# Patient Record
Sex: FEMALE | Race: BLACK OR AFRICAN AMERICAN | NOT HISPANIC OR LATINO | Employment: FULL TIME | URBAN - METROPOLITAN AREA
[De-identification: names, ages, dates, MRNs, and addresses within clinical notes are randomized per-mention and may not be internally consistent; named-entity substitution may affect disease eponyms.]

---

## 2017-01-10 ENCOUNTER — TELEPHONE (OUTPATIENT)
Dept: INTERNAL MEDICINE | Facility: CLINIC | Age: 57
End: 2017-01-10

## 2017-01-10 ENCOUNTER — OFFICE VISIT (OUTPATIENT)
Dept: INTERNAL MEDICINE | Facility: CLINIC | Age: 57
End: 2017-01-10
Payer: COMMERCIAL

## 2017-01-10 VITALS
DIASTOLIC BLOOD PRESSURE: 76 MMHG | WEIGHT: 280.88 LBS | HEART RATE: 85 BPM | BODY MASS INDEX: 46.8 KG/M2 | OXYGEN SATURATION: 99 % | HEIGHT: 65 IN | SYSTOLIC BLOOD PRESSURE: 105 MMHG | TEMPERATURE: 98 F

## 2017-01-10 DIAGNOSIS — H65.192 OTHER ACUTE NONSUPPURATIVE OTITIS MEDIA OF LEFT EAR, RECURRENCE NOT SPECIFIED: Primary | ICD-10-CM

## 2017-01-10 DIAGNOSIS — E66.01 MORBID OBESITY WITH BMI OF 45.0-49.9, ADULT: ICD-10-CM

## 2017-01-10 PROCEDURE — 99999 PR PBB SHADOW E&M-EST. PATIENT-LVL III: CPT | Mod: PBBFAC,,, | Performed by: INTERNAL MEDICINE

## 2017-01-10 PROCEDURE — 99214 OFFICE O/P EST MOD 30 MIN: CPT | Mod: S$GLB,,, | Performed by: INTERNAL MEDICINE

## 2017-01-10 PROCEDURE — 1159F MED LIST DOCD IN RCRD: CPT | Mod: S$GLB,,, | Performed by: INTERNAL MEDICINE

## 2017-01-10 RX ORDER — AMOXICILLIN AND CLAVULANATE POTASSIUM 875; 125 MG/1; MG/1
1 TABLET, FILM COATED ORAL EVERY 12 HOURS
Qty: 10 TABLET | Refills: 0 | Status: SHIPPED | OUTPATIENT
Start: 2017-01-10 | End: 2017-04-26

## 2017-01-10 NOTE — MR AVS SNAPSHOT
[facility] - Internal Medicine  [address]  [facility] [address]  Phone: [phone]  Fax: [phone]                  Trini Harris   1/10/2017 3:00 PM   Office Visit    Description:  Female : 1960   Provider:  Oscar Thomason MD   Department:  [facility] - Internal Medicine           Reason for Visit     Otalgia           Diagnoses this Visit        Comments    Other acute nonsuppurative otitis media of left ear, recurrence not specified    -  Primary     Morbid obesity with BMI of 45.0-49.9, adult                To Do List           Goals (5 Years of Data)     None      Follow-Up and Disposition     Return if symptoms worsen or fail to improve.       These Medications        Disp Refills Start End    amoxicillin-clavulanate 875-125mg (AUGMENTIN) 875-125 mg per tablet 10 tablet 0 1/10/2017     Take 1 tablet by mouth every 12 (twelve) hours. - Oral    Pharmacy: [facility] - [address] #: [phone]         [facility]     [facility] On [facility] Nurse Care Line -  Assistance  Registered nurses in the [facility] Center provide clinical advisement, health education, appointment booking, and other advisory services.  Call for this free service at [phone].             Medications           Message regarding Medications     Verify the changes and/or additions to your medication regime listed below are the same as discussed with your clinician today.  If any of these changes or additions are incorrect, please notify your healthcare provider.        START taking these NEW medications        Refills    amoxicillin-clavulanate 875-125mg (AUGMENTIN) 875-125 mg per tablet 0    Sig: Take 1 tablet by mouth every 12 (twelve) hours.    Class: Normal    Route: Oral           Verify that the below list of medications is an accurate representation of the medications you are currently taking.  If none reported, the list may be blank. If  "incorrect, please contact your healthcare provider. Carry this list with you in case of emergency.           Current Medications     amoxicillin-clavulanate 875-125mg (AUGMENTIN) 875-125 mg per tablet Take 1 tablet by mouth every 12 (twelve) hours.           Clinical Reference Information           Vital Signs - Last Recorded  Most recent update: 1/10/2017  3:10 PM by Ivana Ovalle MA    BP Pulse Temp Ht Wt LMP    105/76 (BP Location: Left arm, Patient Position: Sitting) 85 97.7 °F (36.5 °C) (Oral) 5' 5" (1.651 m) 127.4 kg (280 lb 13.9 oz) 06/12/2015    SpO2 BMI             99% 46.74 kg/m2         Blood Pressure          Most Recent Value    BP  105/76      Allergies as of 1/10/2017     No Known Allergies      Immunizations Administered on Date of Encounter - 1/10/2017     None      Instructions    Flonase (fluticasone) nasal spray - twice a day for 1 week, then once a day thereafter  Claritin (loratadine) or Zyrtec (cetirizine) once a day  Mucinex (guaifenesin) every 8-12 hours with plenty of water.       Middle Ear Infection (Adult)  You have an infection of the middle ear, the space behind the eardrum. This is also called acute otitis media (AOM). Sometimes it is caused by the common cold. This is because congestion can block the internal passage (eustachian tube) that drains fluid from the middle ear. When the middle ear fills with fluid, bacteria can grow there and cause an infection. Oral antibiotics are used to treat this illness, not ear drops. Symptoms usually start to improve within 1 to 2 days of treatment.    Home care  The following are general care guidelines:  · Finish all of the antibiotic medicine given, even though you may feel better after the first few days.  · You may use over-the-counter medicine, such as acetaminophen or ibuprofen, to control pain and fever, unless something else was prescribed. If you have chronic liver or kidney disease or have ever had a stomach ulcer or " gastrointestinal bleeding, talk with your healthcare provider before using these medicines. Do not give aspirin to anyone under 18 years of age who has a fever. It may cause severe illness or death.  Follow-up care  Follow up with your healthcare provider, or as advised, in 2 weeks if all symptoms have not gotten better, or if hearing doesn't go back to normal within 1 month.  When to seek medical advice  Call your healthcare provider right away if any of these occur:  · Ear pain gets worse or does not improve after 3 days of treatment  · Unusual drowsiness or confusion  · Neck pain, stiff neck, or headache  · Fluid or blood draining from the ear canal  · Fever of 100.4°F (38°C) or as advised   · Seizure  © 6856-9028 [employer]. [address] [place] [address], [place] [address]. All rights reserved. This information is not intended as a substitute for professional medical care. Always follow your healthcare professional's instructions.

## 2017-01-10 NOTE — Clinical Note
Haritha - I saw Ms Harris today for an urgent visit. She is a patient of Dr Moon but interested in losing weight and working with you to do this. Thanks! Oscar Thomason MD

## 2017-01-10 NOTE — TELEPHONE ENCOUNTER
..Patient referred by Dr. Thomason for Health coaching (weight loss, lifestyle changes).  Called patient and gave an explanation about Health Coaching program and invited participation.   Patient states she would like to participate.  Set appointment for 1/25/17 @ 1500   Gave direct contact number to call if he/ she has any questions [phone].   Haritha Egan RN  Health

## 2017-01-10 NOTE — PROGRESS NOTES
"Subjective:       Patient ID: Trini Harris is a 56 y.o. female.    Chief Complaint: Otalgia (Left)    HPI  55 y/o woman here for urgent visit for ear pain.    Ear pain - recent URI (congestion, sinus pressure) last week, had been feeling better in the past 3 days except the ear pain on left side is not improving. Throat sore on left side. No fevers. No drainage from ear.   Took excedrin (some kind of cold medication OTC) yesterday "to make sure I was draining".      Review of Systems   HENT: Positive for congestion, ear pain, postnasal drip, sinus pressure and sore throat. Negative for facial swelling, hearing loss, trouble swallowing and voice change.    Respiratory: Negative for shortness of breath. Cough: improving.    Cardiovascular: Negative for chest pain and leg swelling.   Gastrointestinal: Negative for abdominal pain and nausea.   Genitourinary: Negative.    Musculoskeletal: Negative for joint swelling.   Skin: Negative for rash.   Neurological: Negative for dizziness and numbness.         Past medical history, surgical history, and family medical history reviewed and updated as appropriate.    Medications and allergies reviewed.     Objective:          Vitals:    01/10/17 1507   BP: 105/76   BP Location: Left arm   Patient Position: Sitting   Pulse: 85   Temp: 97.7 °F (36.5 °C)   TempSrc: Oral   SpO2: 99%   Weight: 127.4 kg (280 lb 13.9 oz)   Height: 5' 5" (1.651 m)     Body mass index is 46.74 kg/(m^2).  Physical Exam   Constitutional: She is oriented to person, place, and time. She appears well-developed and well-nourished. No distress.   HENT:   Head: Normocephalic and atraumatic.   Right Ear: External ear and ear canal normal. Tympanic membrane is injected.   Left Ear: External ear and ear canal normal. Tympanic membrane is injected and bulging. A middle ear effusion is present.   Nose: Mucosal edema present. Right sinus exhibits no maxillary sinus tenderness and no frontal sinus tenderness. Left sinus " exhibits no maxillary sinus tenderness and no frontal sinus tenderness.   Mouth/Throat: Posterior oropharyngeal erythema present. No oropharyngeal exudate. No tonsillar exudate.   Eyes: Conjunctivae and EOM are normal. Pupils are equal, round, and reactive to light.   Neck: Neck supple.   Cardiovascular: Normal rate, regular rhythm and normal heart sounds.    Pulmonary/Chest: Effort normal and breath sounds normal. No respiratory distress. She has no wheezes.   Abdominal: Soft. She exhibits no distension. There is no tenderness.   Musculoskeletal: She exhibits no edema or tenderness.   Lymphadenopathy:     She has cervical adenopathy (mild tender LAD, L >R).   Neurological: She is alert and oriented to person, place, and time. Gait normal.   Skin: Skin is warm and dry.   Psychiatric: She has a normal mood and affect.   Vitals reviewed.      Lab Results   Component Value Date    WBC 5.20 10/01/2016    HGB 11.5 (L) 10/01/2016    HCT 36.4 (L) 10/01/2016     10/01/2016    CHOL 149 10/01/2016    TRIG 69 10/01/2016    HDL 40 10/01/2016    ALT 12 10/01/2016    AST 14 10/01/2016     10/01/2016    K 4.4 10/01/2016     10/01/2016    CREATININE 0.8 10/01/2016    BUN 11 10/01/2016    CO2 24 10/01/2016    TSH 2.921 10/01/2016    HGBA1C 5.7 10/01/2016       Assessment:       1. Other acute nonsuppurative otitis media of left ear, recurrence not specified    2. Morbid obesity with BMI of 45.0-49.9, adult        Plan:   Trini was seen today for otalgia.    Diagnoses and all orders for this visit:    Other acute nonsuppurative otitis media of left ear, recurrence not specified - Patient education handout given. Recommended use saline nasal spray, steroid nasal spray, 2nd gen antihistamine to help manage sinus congestion  -     amoxicillin-clavulanate 875-125mg (AUGMENTIN) 875-125 mg per tablet; Take 1 tablet by mouth every 12 (twelve) hours.    Morbid obesity with BMI of 45.0-49.9, adult - discussed briefly with  patient, recommended she discuss this in more detail at next PCP visit. Referred to health .     Return if symptoms worsen or fail to improve.    Oscar Thomason MD  Internal Medicine  [facility]  1/10/2017

## 2017-01-10 NOTE — TELEPHONE ENCOUNTER
----- Message from Oscar Thomason MD sent at 1/10/2017  3:32 PM CST -----  Haritha - I saw Ms Harris today for an urgent visit. She is a patient of Dr Moon but interested in losing weight and working with you to do this.  Thanks!  Oscar Thomason MD

## 2017-01-10 NOTE — PATIENT INSTRUCTIONS
Flonase (fluticasone) nasal spray - twice a day for 1 week, then once a day thereafter  Claritin (loratadine) or Zyrtec (cetirizine) once a day  Mucinex (guaifenesin) every 8-12 hours with plenty of water.       Middle Ear Infection (Adult)  You have an infection of the middle ear, the space behind the eardrum. This is also called acute otitis media (AOM). Sometimes it is caused by the common cold. This is because congestion can block the internal passage (eustachian tube) that drains fluid from the middle ear. When the middle ear fills with fluid, bacteria can grow there and cause an infection. Oral antibiotics are used to treat this illness, not ear drops. Symptoms usually start to improve within 1 to 2 days of treatment.    Home care  The following are general care guidelines:  · Finish all of the antibiotic medicine given, even though you may feel better after the first few days.  · You may use over-the-counter medicine, such as acetaminophen or ibuprofen, to control pain and fever, unless something else was prescribed. If you have chronic liver or kidney disease or have ever had a stomach ulcer or gastrointestinal bleeding, talk with your healthcare provider before using these medicines. Do not give aspirin to anyone under 18 years of age who has a fever. It may cause severe illness or death.  Follow-up care  Follow up with your healthcare provider, or as advised, in 2 weeks if all symptoms have not gotten better, or if hearing doesn't go back to normal within 1 month.  When to seek medical advice  Call your healthcare provider right away if any of these occur:  · Ear pain gets worse or does not improve after 3 days of treatment  · Unusual drowsiness or confusion  · Neck pain, stiff neck, or headache  · Fluid or blood draining from the ear canal  · Fever of 100.4°F (38°C) or as advised   · Seizure  © 1381-3686 [employer]. [address] [place] [address], [place] [address]. All rights reserved. This  information is not intended as a substitute for professional medical care. Always follow your healthcare professional's instructions.

## 2017-01-25 ENCOUNTER — CLINICAL SUPPORT (OUTPATIENT)
Dept: INTERNAL MEDICINE | Facility: CLINIC | Age: 57
End: 2017-01-25
Payer: COMMERCIAL

## 2017-01-25 VITALS — WEIGHT: 285.5 LBS | BODY MASS INDEX: 47.51 KG/M2

## 2017-01-25 NOTE — PROGRESS NOTES
Date:   1/25/17                   Time: 1530  Initial Health  Contact - [x]Clinic visit  []  Phone Visit  ASSEMENT: Information obtained through combination of chart review prior to seeing patient, patient self-report.   Primary Referral diagnosis/reason for referral:    Weight loss     Other:    (See physician progress note for complete list of diagnoses.)  PCP:  Dr. Moon      Referent :  [] PCP       [] Transition Navigator    []  BOBBY    []  JOSEPHINE  [x]  Other: Dr. Thomason    Supports:          Preferred Learning Style  (may be a combination)  []  Visual      [] Auditory     []  Reading    [x]  Hands-on    []  1:1    [x]  Group        [x]  Alone       [x]  No preference   []  Combination  []  Other:         Presenting issues from patients point of view:  [x]  Improve nutrition/increase healthy choices.                    [x]  Improve /maintain current functioning.  []  Obtain and maintain healthy blood pressure.                  []  Stop smoking.  [x]  Improved weight management.                                       [x]  Lower cholesterol.Raise HDL  []  Improved blood glucose management.                            []  Improve breathing.  []  Increase energy level.                                                      []  Increase/maintain independence.  []  Improve sleep.                                                                []  Decrease stress.  []  Improve pain management.                                             []  Improve mood.  []  Other:                  Pt. Education Level:  Business school          Disease specific education services  attended:    None       Patient Employed:  [x]yes    [] No  Days and Hours:  [employer] 630-300 M-W, 7-330 Th-F             Current Self-help Behaviors  []  Checks glucose level        times a day.  [x]  Tries to modify meals so more healthy.  [x]  Exercises by  walking      []  Takes BP        times a       (insert frequency)  []  Weighs  self         (how often)  []  Takes all medications as prescribed.  [x]  Rarely misses health care appointments.  []  Sleeps 8 hours without waking more than twice.  [x]  Consistently wears/uses functioning aids as recommended  (ie glasses, hearing  aid, prosthesis,  walker,  wheelchair etc.)  [x]  Regularly engages in stress management activities   I.e.: prayer  []  Quit smoking   [x]  Purposefully educates self about health issues.  []  Pt did bring glucose log with him/her.  []  Other  (explain)           []  Comments:       []  Reported Blood Sugar Readings:          Health screenings   Last PCP visit:   9/21/16   GYN/Pap:  Kevin Shipman 11/2016                         MMG:      Kevin Shipman 12/21/16                                         CMP:   10/1/16   HgA1C: 10/1/16 - 5.7             Eye Exam:   Dr. [facility] -Normal   Foot Exam: podiatrist ankle check 6/22/15     Lipids: 10/1/16    Strengths:  [x]  Confident     [x]  Creative    [x]  Determined/persistent    [x]  Enthusiastic         [x]  Flexible  [x]  Good problem solving         [x]  Good self-control            [x]  Hard working        []  Hopeful/optimistic               [x]  Intelligent                         [x]  Pos support network  [x]  Self-aware        [x]  Sense of humor                                [x]  Open/willing       [x]  Spiritual            [] Values health    [x]  Successful overcoming difficult challenges in the past.                  [x]   Health literate (The degree to which individuals have the capacity to obtain, process, and    understand basic health information and services needed to make appropriate health decisions.- Dept. of Health and Human services.)  []   Other Comments:             Change Markers  Scale 1-10 with 10 representing most important, most confident.     [] NA at this time.   Readiness: 10    ;  Importance: 10    ;  Confidence:  10       INTERVENTIONS:  [x] Given an explanation about health coaching program and invited participation.  [x] Listened reflectively; provided support, encouragement, and validation; respected  and maintained patient self-direction; explored pros/cons of change; personalized health risks of maintaining the status quo; and facilitated recognition of discrepancy between current behaviors and patients goals and values.  [x] Assessed stage of change and employed appropriate motivational interviewing strategies to facilitate movement toward the next stage of change and healthy behavior modification.  [x] Normalized occurrence of relapse, helped patient recognize outcome of new self-learning as a result of the relapse such as triggers, and helped focus on factors to reduce chances of returning to previous behaviors .  [x] Used readiness scale ratings to guide assessment of importance and confidence of successfully reaching goals.  [x] Assisted patient to develop goal, action plan, and address potential barriers to goal     achievement.  [] Patient was given a new (insert glucose meter or other supplies), taught to use, and      successfully demonstrated ability to carry out essential steps of process.    [] Rx for ( monitor/supplies, pen needles, etc.) was obtained.  [x] Provided educational review, written materials/handouts (Meal Planning Counting Carbs, Healthy Plate, 10 Rules for Eating Safely for Life, 10 Tips to Cutting Your Cravings, probiotics).   [x] Topics discussed/provided:    GI of foods and how it affects your metabolism and helps you burn fat, carb counting    [x] Facilitated completion of needed exams and screenings by reviewing Diabetic/Health Maintenance Report Card with patient.  [x] Provided pt with my business card.  [x] Informed of/reviewed how to access health  and after hours assistance.  [x] Discussed, planned, and scheduled future contacts.  [x]Challenges/Barriers identified discussed as  identified by patient.  [x] Needs identified by this Health :    Education and support     [] Other:            For additional care management support patient was referred to:        []  Community resources for                        []  Medication assistance programs               []  Dietician              []  Diabetes  [employer] [place]                                        []  Mental health services                           []                  []  Diabetes [place]                                              []  Home health services                                []  Complex case management              []  PCP/covering provider due to                 []  Emergency Dept.                                  []  GYN              []  Optometrist/ Ophthalmologist                          []  Podiatrist                                                      [x]  N/A        []  Other:             RESPONSE/IMPRESSION  Behavior is consistent with the following stage of change:  []  Pre-contemplation  []  Contemplation  [x]  Preparation  []  Action  []  Maintenance  []  Relapse    Level of participation:  []  Refused to participate  []  Guarded / resistant  []  Passive participant  [x]  Active participant/interactive  [x]  Interested/receptive  [x]  Self-motivated  []  Other          Goal developed with patient today:     I need to walk more 4-5 x week 30-40 min 10/10  Eat healthy food- veg, fruit etc 10/10  Drink lots of water 8 bottles day 10/10    Plan (needed actions to accomplish goal):          [x] Pt will work on action plan as stated above.        [x] Pt will follow up with Health  on 2/7/17 at 1530.         [x] Health  will remain available.  [x] Health  will maintain regular contacts with patient to educate, support, encourage; help problem-solve; assist with development of self-advocacy/increasing independent health management; and provide resources as needed.  [] Pt has declined Health   Program at this time. Provided pt with Health  Program information should they decide to participate at a later time.        [] Pt to facilitate contact with Health        [] Health  to facilitate contact with patient.        [] Other:          Notes:  Met with patient she is motivated to lose weight her best reasons are to live longer, be more active and have a better sex life.   Goals set with patient and will assist to achieve her goals.     Best Method of Contact:  [] email:   [x] Phone:   [] Mail  [] Office     Haritha Egan RN HC

## 2017-02-07 ENCOUNTER — TELEPHONE (OUTPATIENT)
Dept: INTERNAL MEDICINE | Facility: CLINIC | Age: 57
End: 2017-02-07
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
## 2017-02-22 ENCOUNTER — CLINICAL SUPPORT (OUTPATIENT)
Dept: INTERNAL MEDICINE | Facility: CLINIC | Age: 57
End: 2017-02-22
Payer: COMMERCIAL

## 2017-02-22 VITALS — WEIGHT: 282.44 LBS | BODY MASS INDEX: 47 KG/M2

## 2017-02-22 NOTE — PROGRESS NOTES
Health  Follow-Up Note   Date:   2/22/16               Time: 1600  [x] Office  [] Phone  Notes from previous session reviewed.   [] Previous Session Goals unchanged.   [] Patient/Caregiver Change in Goals.  Goals added or changed by Patient/Caregiver since program participation:  1.    Start walking  2. Stay on track  3. Keep under 25 gms sugar day     Additional/Changed support that patient/caregiver has experienced/sought?  (Indicate readiness, support from family, friends, others, community groups, etc)  1.  daughter and     Feedback provided:  1.  praised for great job down 3 lbs    Diagnostic values/Desriptors for follow-up as needed for chronic condition(s)   Weight: 128.1 kg  282 lb 6.6 oz    Interventions:   1. Health  listened reflectively, validated thoughts and feelings, offered support and encouragement.   2. Allowed patient to express themselves in a non-biased atmosphere.  3. Health  assisted pt to problem-solve obstacles such as being in a challenging environment and dealing with these challenges.   4. Motivational Interviewed interventions utilized (OARS).   5. Patient responded favorably to interventions and remained actively engaged in the session.   6. Health  will remain available and connected for patient by phone and/or office visits.   7. Positive reinforcement, emotional support and encouragement provided.   8. Focused Education: MI, exercise    Plan:  [x] Pt will work on goals as stated above.   [x] Pt will contact Health  for any questions, concerns or needs.  [x] Pt will follow up with Health  in office 3/13/17 at 400 pm        [] Pt will follow up with Health  on phone in:        [x] Health  will remain available.   [] Health  will contact patient by phone in:        [] Health  will consult:        [] Health  will inform Provider via EPIC messaging.     Impression:  1. Behavior is consistent with  Action     Stage of Change.    2. Participation level:       [x] Receptive      [x] Interactive      [] Guarded and Resistant      [x] Self Motivated      [] Refused/Declined to participate   3. [x] Pt voiced understanding of all information presented.       [] Pt voiced needing further information/education. This will be arranged.       [x] Pt would benefit from further education/information as identified by this health . This will be arranged.     Haritha Egan RN HC

## 2017-03-13 ENCOUNTER — CLINICAL SUPPORT (OUTPATIENT)
Dept: INTERNAL MEDICINE | Facility: CLINIC | Age: 57
End: 2017-03-13
Payer: COMMERCIAL

## 2017-03-13 VITALS — BODY MASS INDEX: 46.78 KG/M2 | WEIGHT: 281.06 LBS

## 2017-03-13 NOTE — PROGRESS NOTES
Health  Follow-Up Note   [x] Office  [] Phone  Notes from previous session reviewed.   [x] Previous Session Goals unchanged.   [] Patient/Caregiver Change in Goals.  Goals added or changed by Patient/Caregiver since program participation:  1.     Start walking 2x day      Additional/Changed support that patient/caregiver has experienced/sought?  (Indicate readiness, support from family, friends, others, community groups, etc)  1.  sister-in -law, daughter    Additional/Changed obstacles that could prevent patient/caregiver from reaching their goals?  1.  Donuts, sugar craving    Feedback provided:  1.  Praised great effort and job lost 1.5 lb    Diagnostic values/Desriptors for follow-up as needed for chronic condition(s)   Weight: 127.5 kg 281 lb 1.4 oz    Interventions:   1. Health  listened reflectively, validated thoughts and feelings, offered support and encouragement.   2. Allowed patient to express themselves in a non-biased atmosphere.  3. Health  assisted pt to problem-solve obstacles such as being in a challenging environment and dealing with these challenges.   4. Motivational Interviewed interventions utilized (OARS).   5. Patient responded favorably to interventions and remained actively engaged in the session.   6. Health  will remain available and connected for patient by phone and/or office visits.   7. Positive reinforcement, emotional support and encouragement provided.   8. Focused Education: MI, sugar    Plan:  [x] Pt will work on goals as stated above.   [x] Pt will contact Health  for any questions, concerns or needs.  [x] Pt will follow up with Health  in office on 3/28/17 at 1600.       [] Pt will follow up with Health  on phone in:        [x] Health  will remain available.   [] Health  will contact patient by phone in:        [] Health  will consult:        [] Health  will inform Provider via EPIC messaging.     Impression:  1. Behavior  is consistent with   Action Stage of Change.   2. Participation level:       [x] Receptive      [x] Interactive      [] Guarded and Resistant      [x] Self Motivated      [] Refused/Declined to participate   3. [x] Pt voiced understanding of all information presented.       [] Pt voiced needing further information/education. This will be arranged.       [x] Pt would benefit from further education/information as identified by this health . This will be arranged.     Haritha Egan RN HC

## 2017-03-28 ENCOUNTER — CLINICAL SUPPORT (OUTPATIENT)
Dept: INTERNAL MEDICINE | Facility: CLINIC | Age: 57
End: 2017-03-28
Payer: COMMERCIAL

## 2017-03-28 VITALS — WEIGHT: 281.63 LBS | BODY MASS INDEX: 46.86 KG/M2

## 2017-03-28 NOTE — PROGRESS NOTES
Health  Follow-Up Note   [x] Office  [] Phone  Notes from previous session reviewed.   [x] Previous Session Goals unchanged.   [] Patient/Caregiver Change in Goals.  Goals added or changed by Patient/Caregiver since program participation:  1. Continue same goal  2. Portion control     Additional/Changed support that patient/caregiver has experienced/sought?  (Indicate readiness, support from family, friends, others, community groups, etc)  1.      Additional/Changed obstacles that could prevent patient/caregiver from reaching their goals?  1.  Not exercising enough    Feedback provided:  1.  Praised for continued effort    Diagnostic values/Desriptors for follow-up as needed for chronic condition(s)   Weight: 127.7 kg 281.61 lb gain .5 lb    Interventions:   1. Health  listened reflectively, validated thoughts and feelings, offered support and encouragement.   2. Allowed patient to express themselves in a non-biased atmosphere.  3. Health  assisted pt to problem-solve obstacles such as being in a challenging environment and dealing with these challenges.   4. Motivational Interviewed interventions utilized (OARS).   5. Patient responded favorably to interventions and remained actively engaged in the session.   6. Health  will remain available and connected for patient by phone and/or office visits.   7. Positive reinforcement, emotional support and encouragement provided.   8. Focused Education: MI, food pairing,    Plan:  [x] Pt will work on goals as stated above.   [x] Pt will contact Health  for any questions, concerns or needs.  [x] Pt will follow up with Health  in office 4/10/17 at 1600.        [] Pt will follow up with Health  on phone in:        [x] Health  will remain available.   [] Health  will contact patient by phone in:        [] Health  will consult:        [] Health  will inform Provider via EPIC messaging.     Impression:  1. Behavior is  consistent with Action Stage of Change.   2. Participation level:       [x] Receptive      [x] Interactive      [] Guarded and Resistant      [x] Self Motivated      [] Refused/Declined to participate   3. [x] Pt voiced understanding of all information presented.       [] Pt voiced needing further information/education. This will be arranged.       [x] Pt would benefit from further education/information as identified by this health . This will be arranged.     Haritha Egan RN HC

## 2017-04-26 ENCOUNTER — OFFICE VISIT (OUTPATIENT)
Dept: INTERNAL MEDICINE | Facility: CLINIC | Age: 57
End: 2017-04-26
Payer: COMMERCIAL

## 2017-04-26 VITALS
WEIGHT: 274.69 LBS | BODY MASS INDEX: 45.77 KG/M2 | SYSTOLIC BLOOD PRESSURE: 122 MMHG | HEIGHT: 65 IN | TEMPERATURE: 98 F | DIASTOLIC BLOOD PRESSURE: 79 MMHG | OXYGEN SATURATION: 98 % | HEART RATE: 83 BPM

## 2017-04-26 DIAGNOSIS — J40 BRONCHITIS: Primary | ICD-10-CM

## 2017-04-26 PROCEDURE — 99999 PR PBB SHADOW E&M-EST. PATIENT-LVL III: CPT | Mod: PBBFAC,,, | Performed by: NURSE PRACTITIONER

## 2017-04-26 PROCEDURE — 99213 OFFICE O/P EST LOW 20 MIN: CPT | Mod: S$GLB,,, | Performed by: NURSE PRACTITIONER

## 2017-04-26 PROCEDURE — 1160F RVW MEDS BY RX/DR IN RCRD: CPT | Mod: S$GLB,,, | Performed by: NURSE PRACTITIONER

## 2017-04-26 RX ORDER — PROMETHAZINE HYDROCHLORIDE AND CODEINE PHOSPHATE 6.25; 1 MG/5ML; MG/5ML
5 SOLUTION ORAL EVERY 4 HOURS PRN
Qty: 120 ML | Refills: 0 | Status: SHIPPED | OUTPATIENT
Start: 2017-04-26 | End: 2017-05-06

## 2017-04-26 RX ORDER — BENZONATATE 200 MG/1
200 CAPSULE ORAL 2 TIMES DAILY PRN
Qty: 30 CAPSULE | Refills: 0 | Status: SHIPPED | OUTPATIENT
Start: 2017-04-26 | End: 2017-12-19

## 2017-04-26 RX ORDER — PREDNISONE 20 MG/1
20 TABLET ORAL DAILY
Qty: 3 TABLET | Refills: 0 | Status: SHIPPED | OUTPATIENT
Start: 2017-04-26 | End: 2017-04-29

## 2017-04-26 NOTE — MR AVS SNAPSHOT
[facility]  [address]  [place] [address]  Phone: [phone]  Fax: [phone]                  Trini Harris   2017 3:00 PM   Office Visit    Description:  Female : 1960   Provider:  Macey Louise NP   Department:  [facility]           Reason for Visit     Cough           Diagnoses this Visit        Comments    Bronchitis    -  Primary            To Do List           Goals (5 Years of Data)     None       These Medications        Disp Refills Start End    predniSONE (DELTASONE) 20 MG tablet 3 tablet 0 2017    Take 1 tablet (20 mg total) by mouth once daily. - Oral    Pharmacy: [facility] - [address] Ph #: [phone]       promethazine-codeine 6.25-10 mg/5 ml (PHENERGAN WITH CODEINE) 6.25-10 mg/5 mL syrup 120 mL 0 2017    Take 5 mLs by mouth every 4 (four) hours as needed for Cough. - Oral    Pharmacy: [facility] - [address] Ph #: [phone]       benzonatate (TESSALON) 200 MG capsule 30 capsule 0 2017     Take 1 capsule (200 mg total) by mouth 2 (two) times daily as needed. - Oral    Pharmacy: [facility] - [place], [place] - [address] Ph #: [phone]         [facility]     [facility] Nurse Care Line - 24/ Assistance  Unless otherwise directed by your provider, please contact [facility], our nurse care line that is available for 24/ assistance.     Registered nurses in the [facility] provide: appointment scheduling, clinical advisement, health education, and other advisory services.  Call: [phone] (toll free)               Medications           Message regarding Medications     Verify the changes and/or additions to your medication regime listed below are the same as discussed with your clinician today.  If any of these changes or additions are  "incorrect, please notify your healthcare provider.        START taking these NEW medications        Refills    predniSONE (DELTASONE) 20 MG tablet 0    Sig: Take 1 tablet (20 mg total) by mouth once daily.    Class: Normal    Route: Oral    promethazine-codeine 6.25-10 mg/5 ml (PHENERGAN WITH CODEINE) 6.25-10 mg/5 mL syrup 0    Sig: Take 5 mLs by mouth every 4 (four) hours as needed for Cough.    Class: Normal    Route: Oral    benzonatate (TESSALON) 200 MG capsule 0    Sig: Take 1 capsule (200 mg total) by mouth 2 (two) times daily as needed.    Class: Normal    Route: Oral      STOP taking these medications     amoxicillin-clavulanate 875-125mg (AUGMENTIN) 875-125 mg per tablet Take 1 tablet by mouth every 12 (twelve) hours.           Verify that the below list of medications is an accurate representation of the medications you are currently taking.  If none reported, the list may be blank. If incorrect, please contact your healthcare provider. Carry this list with you in case of emergency.           Current Medications     benzonatate (TESSALON) 200 MG capsule Take 1 capsule (200 mg total) by mouth 2 (two) times daily as needed.    predniSONE (DELTASONE) 20 MG tablet Take 1 tablet (20 mg total) by mouth once daily.    promethazine-codeine 6.25-10 mg/5 ml (PHENERGAN WITH CODEINE) 6.25-10 mg/5 mL syrup Take 5 mLs by mouth every 4 (four) hours as needed for Cough.           Clinical Reference Information           Your Vitals Were     BP Pulse Temp Height Weight Last Period    122/79 83 97.7 °F (36.5 °C) (Oral) 5' 5" (1.651 m) 124.6 kg (274 lb 11.1 oz) 06/12/2015    SpO2 BMI             98% 45.71 kg/m2         Blood Pressure          Most Recent Value    BP  122/79      Allergies as of 4/26/2017     No Known Allergies      Immunizations Administered on Date of Encounter - 4/26/2017     None      Instructions    - Call Monday 5/1/17 if you are still cough.         Language Assistance Services     ATTENTION: Language " assistance services are available, free of charge. Please call [phone].      ATENCIÓN: Si habla yee, tiene a soliman disposición servicios gratuitos de asistencia lingüística. Llame al [phone].     CHÚ Ý: N?u b?n nói Ti?ng Vi?t, có các d?ch v? h? tr? ngôn ng? mi?n phí dành cho b?n. G?i s? [phone].         [facility] complies with applicable Federal civil rights laws and does not discriminate on the basis of race, color, national origin, age, disability, or sex.

## 2017-04-26 NOTE — PROGRESS NOTES
Subjective:       Patient ID: Trini Harris is a 56 y.o. female.    Chief Complaint: Cough (Productive-Green X 2 weeks)  Ms. Harris developed a cough after her daughter brought some strongly, chemically scented mothballs into the house that she found irritating   Cough   This is a new problem. The current episode started 1 to 4 weeks ago. The problem has been unchanged. The problem occurs every few minutes. The cough is productive of sputum. Pertinent negatives include no chest pain, chills, ear congestion, ear pain, fever, headaches, heartburn, hemoptysis, myalgias, nasal congestion, postnasal drip, rash, rhinorrhea, sore throat, shortness of breath, sweats, weight loss or wheezing. The symptoms are aggravated by fumes. She has tried nothing for the symptoms. There is no history of asthma, bronchiectasis, bronchitis, COPD, emphysema, environmental allergies or pneumonia.     Review of Systems   Constitutional: Negative for chills, fever and weight loss.   HENT: Negative for ear pain, postnasal drip, rhinorrhea and sore throat.    Eyes: Negative for visual disturbance.   Respiratory: Positive for cough. Negative for hemoptysis, shortness of breath and wheezing.    Cardiovascular: Negative for chest pain.   Gastrointestinal: Negative for diarrhea, heartburn, nausea and vomiting.   Genitourinary: Negative for difficulty urinating.   Musculoskeletal: Negative for myalgias.   Skin: Negative for rash.   Allergic/Immunologic: Negative for environmental allergies.   Neurological: Negative for headaches.   Psychiatric/Behavioral: Negative for confusion.       Objective:      Physical Exam   Constitutional: She is oriented to person, place, and time. She appears well-developed. No distress.   obese   HENT:   Head: Normocephalic and atraumatic.   Eyes: No scleral icterus.   Neck: Normal range of motion. Neck supple.   Cardiovascular: Normal rate, regular rhythm and normal heart sounds.  Exam reveals no gallop and no friction  rub.    No murmur heard.  Pulmonary/Chest: Effort normal and breath sounds normal. No respiratory distress. She has no wheezes. She has no rales.   Abdominal: Bowel sounds are normal.   Lymphadenopathy:     She has no cervical adenopathy.   Neurological: She is alert and oriented to person, place, and time.   Skin: Skin is warm and dry. She is not diaphoretic.   Psychiatric: She has a normal mood and affect. Her behavior is normal.   Nursing note and vitals reviewed.      Assessment:       1. Bronchitis        Plan:   1. Bronchitis  - predniSONE (DELTASONE) 20 MG tablet; Take 1 tablet (20 mg total) by mouth once daily.  Dispense: 3 tablet; Refill: 0  - promethazine-codeine 6.25-10 mg/5 ml (PHENERGAN WITH CODEINE) 6.25-10 mg/5 mL syrup; Take 5 mLs by mouth every 4 (four) hours as needed for Cough.  Dispense: 120 mL; Refill: 0  - benzonatate (TESSALON) 200 MG capsule; Take 1 capsule (200 mg total) by mouth 2 (two) times daily as needed.  Dispense: 30 capsule; Refill: 0      Pt has been given instructions populated from [employer] database and has verbalized understanding of the after visit summary and information contained wherein.    Follow up with a primary care provider. May go to ER for acute shortness of breath, lightheadedness, fever, or any other emergent complaints or changes in condition.

## 2017-06-28 DIAGNOSIS — Z12.11 COLON CANCER SCREENING: ICD-10-CM

## 2017-12-19 ENCOUNTER — LAB VISIT (OUTPATIENT)
Dept: LAB | Facility: HOSPITAL | Age: 57
End: 2017-12-19
Attending: INTERNAL MEDICINE
Payer: COMMERCIAL

## 2017-12-19 ENCOUNTER — OFFICE VISIT (OUTPATIENT)
Dept: INTERNAL MEDICINE | Facility: CLINIC | Age: 57
End: 2017-12-19
Payer: COMMERCIAL

## 2017-12-19 VITALS
HEART RATE: 62 BPM | HEIGHT: 65 IN | SYSTOLIC BLOOD PRESSURE: 122 MMHG | DIASTOLIC BLOOD PRESSURE: 72 MMHG | WEIGHT: 276.88 LBS | BODY MASS INDEX: 46.13 KG/M2

## 2017-12-19 DIAGNOSIS — Z00.00 ANNUAL PHYSICAL EXAM: Primary | ICD-10-CM

## 2017-12-19 DIAGNOSIS — Z12.11 COLON CANCER SCREENING: ICD-10-CM

## 2017-12-19 DIAGNOSIS — D64.9 ANEMIA, UNSPECIFIED TYPE: ICD-10-CM

## 2017-12-19 DIAGNOSIS — Z00.00 ANNUAL PHYSICAL EXAM: ICD-10-CM

## 2017-12-19 LAB
ALBUMIN SERPL BCP-MCNC: 3.7 G/DL
ALP SERPL-CCNC: 91 U/L
ALT SERPL W/O P-5'-P-CCNC: 15 U/L
ANION GAP SERPL CALC-SCNC: 7 MMOL/L
AST SERPL-CCNC: 15 U/L
BASOPHILS # BLD AUTO: 0.03 K/UL
BASOPHILS NFR BLD: 0.7 %
BILIRUB DIRECT SERPL-MCNC: 0.3 MG/DL
BILIRUB SERPL-MCNC: 0.9 MG/DL
BUN SERPL-MCNC: 10 MG/DL
CALCIUM SERPL-MCNC: 9.7 MG/DL
CHLORIDE SERPL-SCNC: 105 MMOL/L
CHOLEST SERPL-MCNC: 165 MG/DL
CHOLEST/HDLC SERPL: 4.2 {RATIO}
CO2 SERPL-SCNC: 27 MMOL/L
CREAT SERPL-MCNC: 0.8 MG/DL
DIFFERENTIAL METHOD: ABNORMAL
EOSINOPHIL # BLD AUTO: 0.1 K/UL
EOSINOPHIL NFR BLD: 2.2 %
ERYTHROCYTE [DISTWIDTH] IN BLOOD BY AUTOMATED COUNT: 14.2 %
EST. GFR  (AFRICAN AMERICAN): >60 ML/MIN/1.73 M^2
EST. GFR  (NON AFRICAN AMERICAN): >60 ML/MIN/1.73 M^2
ESTIMATED AVG GLUCOSE: 103 MG/DL
GLUCOSE SERPL-MCNC: 88 MG/DL
HBA1C MFR BLD HPLC: 5.2 %
HCT VFR BLD AUTO: 41 %
HDLC SERPL-MCNC: 39 MG/DL
HDLC SERPL: 23.6 %
HGB BLD-MCNC: 12.6 G/DL
IRON SERPL-MCNC: 102 UG/DL
LDLC SERPL CALC-MCNC: 108.4 MG/DL
LYMPHOCYTES # BLD AUTO: 2 K/UL
LYMPHOCYTES NFR BLD: 42.9 %
MCH RBC QN AUTO: 27 PG
MCHC RBC AUTO-ENTMCNC: 30.7 G/DL
MCV RBC AUTO: 88 FL
MONOCYTES # BLD AUTO: 0.4 K/UL
MONOCYTES NFR BLD: 9.6 %
NEUTROPHILS # BLD AUTO: 2 K/UL
NEUTROPHILS NFR BLD: 44.4 %
NONHDLC SERPL-MCNC: 126 MG/DL
NRBC BLD-RTO: 0 /100 WBC
PLATELET # BLD AUTO: 304 K/UL
PMV BLD AUTO: 10.9 FL
POTASSIUM SERPL-SCNC: 4 MMOL/L
PROT SERPL-MCNC: 8 G/DL
RBC # BLD AUTO: 4.66 M/UL
SATURATED IRON: 24 %
SODIUM SERPL-SCNC: 139 MMOL/L
TOTAL IRON BINDING CAPACITY: 432 UG/DL
TRANSFERRIN SERPL-MCNC: 292 MG/DL
TRIGL SERPL-MCNC: 88 MG/DL
TSH SERPL DL<=0.005 MIU/L-ACNC: 2.5 UIU/ML
WBC # BLD AUTO: 4.57 K/UL

## 2017-12-19 PROCEDURE — 99999 PR PBB SHADOW E&M-EST. PATIENT-LVL III: CPT | Mod: PBBFAC,,, | Performed by: INTERNAL MEDICINE

## 2017-12-19 PROCEDURE — 36415 COLL VENOUS BLD VENIPUNCTURE: CPT

## 2017-12-19 PROCEDURE — 83036 HEMOGLOBIN GLYCOSYLATED A1C: CPT

## 2017-12-19 PROCEDURE — 99396 PREV VISIT EST AGE 40-64: CPT | Mod: S$GLB,,, | Performed by: INTERNAL MEDICINE

## 2017-12-19 PROCEDURE — 80048 BASIC METABOLIC PNL TOTAL CA: CPT

## 2017-12-19 PROCEDURE — 83540 ASSAY OF IRON: CPT

## 2017-12-19 PROCEDURE — 84443 ASSAY THYROID STIM HORMONE: CPT

## 2017-12-19 PROCEDURE — 80076 HEPATIC FUNCTION PANEL: CPT

## 2017-12-19 PROCEDURE — 85025 COMPLETE CBC W/AUTO DIFF WBC: CPT

## 2017-12-19 PROCEDURE — 80061 LIPID PANEL: CPT

## 2017-12-19 NOTE — PROGRESS NOTES
"Subjective:       Patient ID: Trini Harris is a 57 y.o. female.    Chief Complaint: Annual Exam   this is a  57 -year-old who pesents today for physical.  Patient reports in general has been doing well  she has stated to do more of an exercise program.   she works at the [employer] and hs been walking daily up the stairs for exercise. She has  Continued to [facility] with he outlying gynecologist for her pap ane mammogram  She reprots up to date.  She has tried eat healthy discussed feeling well since exercisng more regularly  No cp or dyspena. No bowel changes.     HPI  Review of Systems   Constitutional: Negative for fever.   Respiratory: Negative for cough, shortness of breath and wheezing.    Cardiovascular: Negative for chest pain and palpitations.   Gastrointestinal: Negative for abdominal pain and constipation.   Neurological: Negative for dizziness.       Objective:     Blood pressure 122/72, pulse 62, height 5' 5" (1.651 m), weight 125.6 kg (276 lb 14.4 oz), last menstrual period 06/12/2015.    Physical Exam   Constitutional: No distress.   HENT:   Head: Normocephalic.   Mouth/Throat: Oropharynx is clear and moist.   Eyes: No scleral icterus.   Neck: Neck supple.   Cardiovascular: Normal rate, regular rhythm and normal heart sounds.  Exam reveals no gallop and no friction rub.    No murmur heard.  Pulmonary/Chest: Effort normal and breath sounds normal. No respiratory distress.   Abdominal: Soft. Bowel sounds are normal. She exhibits no mass. There is no tenderness.   Musculoskeletal: She exhibits no edema.   Neurological: She is alert.   Skin: No erythema.   Psychiatric: She has a normal mood and affect.   Vitals reviewed.      Assessment:       1. Annual physical exam    2. Colon cancer screening        Plan:       Trini was seen today for annual exam.    Diagnoses and all orders for this visit:    Annual physical exam    Colon cancer screening  Recommended   -     Fecal Immunochemical Test (iFOBT); " Future    She declines flu shot   She continues to follow with her gyn for her annual mammogram     Follow up annually soner if concern  Labs and review

## 2017-12-27 ENCOUNTER — LAB VISIT (OUTPATIENT)
Dept: LAB | Facility: HOSPITAL | Age: 57
End: 2017-12-27
Attending: INTERNAL MEDICINE
Payer: COMMERCIAL

## 2017-12-27 DIAGNOSIS — Z12.11 COLON CANCER SCREENING: ICD-10-CM

## 2017-12-27 PROCEDURE — 82274 ASSAY TEST FOR BLOOD FECAL: CPT

## 2017-12-29 LAB — HEMOCCULT STL QL IA: NEGATIVE

## 2019-01-04 DIAGNOSIS — Z12.11 COLON CANCER SCREENING: ICD-10-CM

## 2019-02-06 ENCOUNTER — OFFICE VISIT (OUTPATIENT)
Dept: INTERNAL MEDICINE | Facility: CLINIC | Age: 59
End: 2019-02-06
Payer: COMMERCIAL

## 2019-02-06 VITALS
OXYGEN SATURATION: 99 % | HEART RATE: 96 BPM | HEIGHT: 65 IN | SYSTOLIC BLOOD PRESSURE: 142 MMHG | BODY MASS INDEX: 47.75 KG/M2 | TEMPERATURE: 99 F | DIASTOLIC BLOOD PRESSURE: 96 MMHG | WEIGHT: 286.63 LBS

## 2019-02-06 DIAGNOSIS — J06.9 VIRAL URI WITH COUGH: Primary | ICD-10-CM

## 2019-02-06 PROCEDURE — 99213 PR OFFICE/OUTPT VISIT, EST, LEVL III, 20-29 MIN: ICD-10-PCS | Mod: S$GLB,,, | Performed by: PHYSICIAN ASSISTANT

## 2019-02-06 PROCEDURE — 3008F BODY MASS INDEX DOCD: CPT | Mod: CPTII,S$GLB,, | Performed by: PHYSICIAN ASSISTANT

## 2019-02-06 PROCEDURE — 99999 PR PBB SHADOW E&M-EST. PATIENT-LVL III: ICD-10-PCS | Mod: PBBFAC,,, | Performed by: PHYSICIAN ASSISTANT

## 2019-02-06 PROCEDURE — 3008F PR BODY MASS INDEX (BMI) DOCUMENTED: ICD-10-PCS | Mod: CPTII,S$GLB,, | Performed by: PHYSICIAN ASSISTANT

## 2019-02-06 PROCEDURE — 99999 PR PBB SHADOW E&M-EST. PATIENT-LVL III: CPT | Mod: PBBFAC,,, | Performed by: PHYSICIAN ASSISTANT

## 2019-02-06 PROCEDURE — 99213 OFFICE O/P EST LOW 20 MIN: CPT | Mod: S$GLB,,, | Performed by: PHYSICIAN ASSISTANT

## 2019-02-06 RX ORDER — FLUTICASONE PROPIONATE 50 MCG
1 SPRAY, SUSPENSION (ML) NASAL DAILY
Qty: 16 G | Refills: 0 | Status: SHIPPED | OUTPATIENT
Start: 2019-02-06 | End: 2019-04-24 | Stop reason: SDUPTHER

## 2019-02-06 NOTE — LETTER
February 6, 2019                 [facility]  Internal Medicine  [address]  [facility] [address]  Phone: [phone]  Fax: [phone]   February 6, 2019     Patient: Trini Harris   YOB: 1960   Date of Visit: 2/6/2019       To Whom it May Concern:    Trini Harris was seen in my clinic on 2/6/2019. She may return to work on 2/8/19.    If you have any questions or concerns, please don't hesitate to call.    Sincerely,           Merced Choi PA-C

## 2019-02-06 NOTE — PROGRESS NOTES
"Subjective:       Patient ID: Trini Harris is a 58 y.o. female.    Established pt of Ashley Moon MD, new to me    Chief Complaint: Nasal Congestion and Cough    URI    This is a new problem. Episode onset: 3 days ago. The problem has been waxing and waning. There has been no fever. Associated symptoms include congestion, coughing, a plugged ear sensation and rhinorrhea. Pertinent negatives include no abdominal pain, chest pain, nausea, rash, sore throat, swollen glands, vomiting or wheezing. Treatments tried: homeopathic ColdCalm Tablets. The treatment provided mild relief.        Review of patient's allergies indicates:  No Known Allergies    Past Medical History:   Diagnosis Date    Obesity     Osteoarthritis      Social History     Tobacco Use    Smoking status: Never Smoker   Substance Use Topics    Alcohol use: Not on file    Drug use: Not on file         Review of Systems   Constitutional: Negative for chills, diaphoresis, fatigue and fever.   HENT: Positive for congestion and rhinorrhea. Negative for sore throat.    Respiratory: Positive for cough. Negative for wheezing.    Cardiovascular: Negative for chest pain and leg swelling.   Gastrointestinal: Negative for abdominal pain, nausea and vomiting.   Skin: Negative for rash.   Hematological: Negative for adenopathy.       Objective: BP (!) 142/96   Pulse 96   Temp 98.7 °F (37.1 °C)   Ht 5' 5" (1.651 m)   Wt 130 kg (286 lb 9.6 oz)   LMP 06/12/2015   SpO2 99%   BMI 47.69 kg/m²         Physical Exam   Constitutional: She appears well-developed and well-nourished. No distress.   HENT:   Head: Normocephalic and atraumatic.   Nose: Mucosal edema present. Right sinus exhibits no maxillary sinus tenderness. Left sinus exhibits no maxillary sinus tenderness.   Mouth/Throat: Oropharynx is clear and moist.   Eyes: Pupils are equal, round, and reactive to light.   Cardiovascular: Normal rate and regular rhythm. Exam reveals no friction rub.   No " murmur heard.  Pulmonary/Chest: Effort normal and breath sounds normal. She has no wheezes. She has no rales.   Lymphadenopathy:     She has no cervical adenopathy.   Neurological: She is alert.   Skin: Skin is warm and dry. Capillary refill takes less than 2 seconds. No rash noted.   Vitals reviewed.        Assessment:       1. Viral URI with cough        Plan:         Trini was seen today for nasal congestion and cough.    Diagnoses and all orders for this visit:    Viral URI with cough   OTC mucinex/cough suppressant   Tylenol prn  -     fluticasone (FLONASE) 50 mcg/actuation nasal spray; 1 spray (50 mcg total) by Each Nare route once daily.   -Stay well hydrated   -RTC if symptoms worsen or fail to improve      LUZ CamposC

## 2019-02-06 NOTE — PATIENT INSTRUCTIONS
Viral Upper Respiratory Illness (Adult)  You have a viral upper respiratory illness (URI), which is another term for the common cold. This illness is contagious during the first few days. It is spread through the air by coughing and sneezing. It may also be spread by direct contact (touching the sick person and then touching your own eyes, nose, or mouth). Frequent handwashing will decrease risk of spread. Most viral illnesses go away within 7 to 10 days with rest and simple home remedies. Sometimes the illness may last for several weeks. Antibiotics will not kill a virus, and they are generally not prescribed for this condition.    Home care  · If symptoms are severe, rest at home for the first 2 to 3 days. When you resume activity, don't let yourself get too tired.  · Avoid being exposed to cigarette smoke (yours or others).  · You may use acetaminophen or ibuprofen to control pain and fever, unless another medicine was prescribed. (Note: If you have chronic liver or kidney disease, have ever had a stomach ulcer or gastrointestinal bleeding, or are taking blood-thinning medicines, talk with your healthcare provider before using these medicines.) Aspirin should never be given to anyone under 18 years of age who is ill with a viral infection or fever. It may cause severe liver or brain damage.  · Your appetite may be poor, so a light diet is fine. Avoid dehydration by drinking 6 to 8 glasses of fluids per day (water, soft drinks, juices, tea, or soup). Extra fluids will help loosen secretions in the nose and lungs.  · Over-the-counter cold medicines will not shorten the length of time youre sick, but they may be helpful for the following symptoms: cough, sore throat, and nasal and sinus congestion. (Note: Do not use decongestants if you have high blood pressure.)  Follow-up care  Follow up with your healthcare provider, or as advised.  When to seek medical advice  Call your healthcare provider right away if any  of these occur:  · Cough with lots of colored sputum (mucus)  · Severe headache; face, neck, or ear pain  · Difficulty swallowing due to throat pain  · Fever of 100.4°F (38°C)  Call 911, or get immediate medical care  Call emergency services right away if any of these occur:  · Chest pain, shortness of breath, wheezing, or difficulty breathing  · Coughing up blood  · Inability to swallow due to throat pain  Date Last Reviewed: 9/13/2015  © 4976-0855 [employer]. [address] [place] [address]. All rights reserved. This information is not intended as a substitute for professional medical care. Always follow your healthcare professional's instructions.

## 2019-03-20 ENCOUNTER — IMMUNIZATION (OUTPATIENT)
Dept: PHARMACY | Facility: CLINIC | Age: 59
End: 2019-03-20
Payer: COMMERCIAL

## 2019-03-20 ENCOUNTER — OFFICE VISIT (OUTPATIENT)
Dept: INTERNAL MEDICINE | Facility: CLINIC | Age: 59
End: 2019-03-20
Payer: COMMERCIAL

## 2019-03-20 VITALS
DIASTOLIC BLOOD PRESSURE: 80 MMHG | WEIGHT: 276.44 LBS | HEIGHT: 65 IN | SYSTOLIC BLOOD PRESSURE: 132 MMHG | BODY MASS INDEX: 46.06 KG/M2

## 2019-03-20 DIAGNOSIS — Z00.00 ANNUAL PHYSICAL EXAM: Primary | ICD-10-CM

## 2019-03-20 DIAGNOSIS — Z12.11 COLON CANCER SCREENING: ICD-10-CM

## 2019-03-20 DIAGNOSIS — E66.9 OBESITY WITHOUT SERIOUS COMORBIDITY, UNSPECIFIED CLASSIFICATION, UNSPECIFIED OBESITY TYPE: ICD-10-CM

## 2019-03-20 PROCEDURE — 99396 PR PREVENTIVE VISIT,EST,40-64: ICD-10-PCS | Mod: S$GLB,,, | Performed by: INTERNAL MEDICINE

## 2019-03-20 PROCEDURE — 99999 PR PBB SHADOW E&M-EST. PATIENT-LVL III: CPT | Mod: PBBFAC,,, | Performed by: INTERNAL MEDICINE

## 2019-03-20 PROCEDURE — 99999 PR PBB SHADOW E&M-EST. PATIENT-LVL III: ICD-10-PCS | Mod: PBBFAC,,, | Performed by: INTERNAL MEDICINE

## 2019-03-20 PROCEDURE — 99396 PREV VISIT EST AGE 40-64: CPT | Mod: S$GLB,,, | Performed by: INTERNAL MEDICINE

## 2019-03-21 NOTE — PROGRESS NOTES
"Subjective:       Patient ID: Trini Harris is a 58 y.o. female.    Chief Complaint: Annual Exam   Lina is a 58-year-old presents today for checkup.  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  She has been working on trying to get her weight down a bit so she stopped eating in the cafeteria at work and has been focusing more on salads physician check and healthier meats.  His she is also hoping to start morbid exercise program she now she is disc active at work.  She is ready to get her colonoscopy scheduled would like to put an order in for.  She has seen her gynecologist at [facility] Dr. her rare and is up-to-date on her Pap and mammogram she will have a copy sent for review. She had a recent upper respiratory infection which is resolved    HPI  Review of Systems   Constitutional: Negative for fever.   Respiratory: Negative for cough, shortness of breath and wheezing.    Cardiovascular: Negative for chest pain and palpitations.   Gastrointestinal: Negative for abdominal pain and constipation.   Musculoskeletal:        Arthritis ankles at times   No swelling    Neurological: Negative for dizziness.       Objective:     Blood pressure 132/80, height 5' 5" (1.651 m), weight 125.4 kg (276 lb 7.3 oz), last menstrual period 06/12/2015.    Physical Exam   Constitutional: No distress.   HENT:   Head: Normocephalic.   Mouth/Throat: Oropharynx is clear and moist.   Eyes: No scleral icterus.   Neck: Neck supple.   Cardiovascular: Normal rate, regular rhythm and normal heart sounds. Exam reveals no gallop and no friction rub.   No murmur heard.  Pulmonary/Chest: Effort normal and breath sounds normal. No respiratory distress.   Abdominal: Soft. Bowel sounds are normal. She exhibits no mass. There is no tenderness.   Musculoskeletal: She exhibits no edema.   Neurological: She is alert.   Skin: No erythema.   Psychiatric: She has a normal mood and affect.   Vitals reviewed.      Assessment:       1. Annual " physical exam    2. Colon cancer screening    3. Obesity without serious comorbidity, unspecified classification, unspecified obesity type        Plan:       Trini was seen today for annual exam.    Diagnoses and all orders for this visit:    Annual physical exam  -     Basic metabolic panel; Future  -     CBC auto differential; Future  -     Hepatic function panel; Future  -     Lipid panel; Future  -     TSH; Future  -     Hemoglobin A1c; Future    Colon cancer screening  Pt feels ready to scheudle her colonscopy when she has some time off  Order placed she can call to schedle   -     Case request GI: COLONOSCOPY    Obesity without serious comorbidity, unspecified classification, unspecified obesity type  Encouraged continued dietary measures weight loss  She has been cutting back on portions and exercising more     She follows with her outlying gynecologist dr. boateng at [facility]  And reports up to date on her mammogram and pap smear     Follow up annually   She declines flu shot   Tetanus/pertussis discussed    We discussed home blood pressure monitoring parameters call with elevations continue weight loss    Follow up annually

## 2019-03-23 ENCOUNTER — LAB VISIT (OUTPATIENT)
Dept: LAB | Facility: HOSPITAL | Age: 59
End: 2019-03-23
Attending: INTERNAL MEDICINE
Payer: COMMERCIAL

## 2019-03-23 DIAGNOSIS — Z00.00 ANNUAL PHYSICAL EXAM: ICD-10-CM

## 2019-03-23 LAB
ALBUMIN SERPL BCP-MCNC: 3.7 G/DL
ALP SERPL-CCNC: 94 U/L
ALT SERPL W/O P-5'-P-CCNC: 17 U/L
ANION GAP SERPL CALC-SCNC: 9 MMOL/L
AST SERPL-CCNC: 16 U/L
BASOPHILS # BLD AUTO: 0.03 K/UL
BASOPHILS NFR BLD: 0.8 %
BILIRUB DIRECT SERPL-MCNC: 0.3 MG/DL
BILIRUB SERPL-MCNC: 0.8 MG/DL
BUN SERPL-MCNC: 9 MG/DL
CALCIUM SERPL-MCNC: 9.4 MG/DL
CHLORIDE SERPL-SCNC: 105 MMOL/L
CHOLEST SERPL-MCNC: 147 MG/DL
CHOLEST/HDLC SERPL: 3.9 {RATIO}
CO2 SERPL-SCNC: 27 MMOL/L
CREAT SERPL-MCNC: 0.8 MG/DL
DIFFERENTIAL METHOD: ABNORMAL
EOSINOPHIL # BLD AUTO: 0.2 K/UL
EOSINOPHIL NFR BLD: 3.8 %
ERYTHROCYTE [DISTWIDTH] IN BLOOD BY AUTOMATED COUNT: 14.1 %
EST. GFR  (AFRICAN AMERICAN): >60 ML/MIN/1.73 M^2
EST. GFR  (NON AFRICAN AMERICAN): >60 ML/MIN/1.73 M^2
ESTIMATED AVG GLUCOSE: 114 MG/DL
GLUCOSE SERPL-MCNC: 102 MG/DL
HBA1C MFR BLD HPLC: 5.6 %
HCT VFR BLD AUTO: 39.8 %
HDLC SERPL-MCNC: 38 MG/DL
HDLC SERPL: 25.9 %
HGB BLD-MCNC: 12.2 G/DL
LDLC SERPL CALC-MCNC: 94.6 MG/DL
LYMPHOCYTES # BLD AUTO: 1.6 K/UL
LYMPHOCYTES NFR BLD: 40.4 %
MCH RBC QN AUTO: 26.8 PG
MCHC RBC AUTO-ENTMCNC: 30.7 G/DL
MCV RBC AUTO: 88 FL
MONOCYTES # BLD AUTO: 0.4 K/UL
MONOCYTES NFR BLD: 10.5 %
NEUTROPHILS # BLD AUTO: 1.8 K/UL
NEUTROPHILS NFR BLD: 44.2 %
NONHDLC SERPL-MCNC: 109 MG/DL
PLATELET # BLD AUTO: 280 K/UL
PMV BLD AUTO: 10.6 FL
POTASSIUM SERPL-SCNC: 4.2 MMOL/L
PROT SERPL-MCNC: 7.3 G/DL
RBC # BLD AUTO: 4.55 M/UL
SODIUM SERPL-SCNC: 141 MMOL/L
TRIGL SERPL-MCNC: 72 MG/DL
TSH SERPL DL<=0.005 MIU/L-ACNC: 1.59 UIU/ML
WBC # BLD AUTO: 3.99 K/UL

## 2019-03-23 PROCEDURE — 83036 HEMOGLOBIN GLYCOSYLATED A1C: CPT

## 2019-03-23 PROCEDURE — 85025 COMPLETE CBC W/AUTO DIFF WBC: CPT

## 2019-03-23 PROCEDURE — 80061 LIPID PANEL: CPT

## 2019-03-23 PROCEDURE — 84443 ASSAY THYROID STIM HORMONE: CPT

## 2019-03-23 PROCEDURE — 36415 COLL VENOUS BLD VENIPUNCTURE: CPT

## 2019-03-23 PROCEDURE — 80048 BASIC METABOLIC PNL TOTAL CA: CPT

## 2019-03-23 PROCEDURE — 80076 HEPATIC FUNCTION PANEL: CPT

## 2019-04-24 ENCOUNTER — OFFICE VISIT (OUTPATIENT)
Dept: INTERNAL MEDICINE | Facility: CLINIC | Age: 59
End: 2019-04-24
Payer: COMMERCIAL

## 2019-04-24 VITALS
SYSTOLIC BLOOD PRESSURE: 140 MMHG | BODY MASS INDEX: 47.99 KG/M2 | OXYGEN SATURATION: 98 % | DIASTOLIC BLOOD PRESSURE: 84 MMHG | WEIGHT: 288.38 LBS | HEART RATE: 83 BPM

## 2019-04-24 DIAGNOSIS — J06.9 UPPER RESPIRATORY TRACT INFECTION, UNSPECIFIED TYPE: Primary | ICD-10-CM

## 2019-04-24 PROCEDURE — 3008F PR BODY MASS INDEX (BMI) DOCUMENTED: ICD-10-PCS | Mod: CPTII,S$GLB,, | Performed by: PHYSICIAN ASSISTANT

## 2019-04-24 PROCEDURE — 99999 PR PBB SHADOW E&M-EST. PATIENT-LVL III: CPT | Mod: PBBFAC,,, | Performed by: PHYSICIAN ASSISTANT

## 2019-04-24 PROCEDURE — 99999 PR PBB SHADOW E&M-EST. PATIENT-LVL III: ICD-10-PCS | Mod: PBBFAC,,, | Performed by: PHYSICIAN ASSISTANT

## 2019-04-24 PROCEDURE — 3008F BODY MASS INDEX DOCD: CPT | Mod: CPTII,S$GLB,, | Performed by: PHYSICIAN ASSISTANT

## 2019-04-24 PROCEDURE — 99213 PR OFFICE/OUTPT VISIT, EST, LEVL III, 20-29 MIN: ICD-10-PCS | Mod: S$GLB,,, | Performed by: PHYSICIAN ASSISTANT

## 2019-04-24 PROCEDURE — 99213 OFFICE O/P EST LOW 20 MIN: CPT | Mod: S$GLB,,, | Performed by: PHYSICIAN ASSISTANT

## 2019-04-24 RX ORDER — FLUTICASONE PROPIONATE 50 MCG
1 SPRAY, SUSPENSION (ML) NASAL DAILY
Qty: 16 G | Refills: 0 | Status: SHIPPED | OUTPATIENT
Start: 2019-04-24 | End: 2020-01-23

## 2019-04-24 RX ORDER — GUAIFENESIN 600 MG/1
1200 TABLET, EXTENDED RELEASE ORAL 2 TIMES DAILY
Qty: 40 TABLET | Refills: 0 | COMMUNITY
Start: 2019-04-24 | End: 2019-05-04

## 2019-04-24 RX ORDER — BENZONATATE 100 MG/1
100 CAPSULE ORAL 3 TIMES DAILY PRN
Qty: 20 CAPSULE | Refills: 0 | Status: SHIPPED | OUTPATIENT
Start: 2019-04-24 | End: 2019-05-04

## 2019-04-24 NOTE — LETTER
April 24, 2019               [facility] - Internal Medicine  Internal Medicine  [address]  [facility] [address]  Phone: [phone]  Fax: [phone]   April 24, 2019     Patient: Trini Harris   YOB: 1960   Date of Visit: 4/24/2019       To Whom it May Concern:    Trini Harris was seen in my clinic on 4/24/2019. She may return to work on 4/29/19.    If you have any questions or concerns, please don't hesitate to call.    Sincerely,           Merced Choi PA-C

## 2019-04-24 NOTE — PROGRESS NOTES
Subjective:       Patient ID: Trini Harris is a 58 y.o. female.    Chief Complaint: URI      Established pt of Ashley Moon MD     Here for same day/urgent care visit.     URI    This is a new problem. The current episode started in the past 7 days (4 days ago). The problem has been waxing and waning. There has been no fever. Associated symptoms include congestion, coughing, rhinorrhea and wheezing (occ; now resolved). Pertinent negatives include no abdominal pain, chest pain, diarrhea, ear pain, rash, sinus pain, sore throat, swollen glands or vomiting. Associated symptoms comments: pnd. She has tried decongestant for the symptoms. The treatment provided mild relief.       Review of patient's allergies indicates:  No Known Allergies    Past Medical History:   Diagnosis Date    Obesity     Osteoarthritis      Social History     Tobacco Use    Smoking status: Never Smoker   Substance Use Topics    Alcohol use: Not on file    Drug use: Not on file         Review of Systems   Constitutional: Negative for chills, diaphoresis and fever.   HENT: Positive for congestion and rhinorrhea. Negative for ear pain, sinus pain and sore throat.    Respiratory: Positive for cough and wheezing (occ; now resolved).    Cardiovascular: Negative for chest pain.   Gastrointestinal: Negative for abdominal pain, diarrhea and vomiting.   Skin: Negative for rash.   Hematological: Negative for adenopathy.       Objective: BP (!) 140/84   Pulse 83   Wt 130.8 kg (288 lb 5.8 oz)   LMP 06/12/2015   SpO2 98%   BMI 47.99 kg/m²         Physical Exam   Constitutional: She appears well-developed and well-nourished. No distress.   HENT:   Head: Normocephalic and atraumatic.   Right Ear: Tympanic membrane, external ear and ear canal normal.   Left Ear: Tympanic membrane, external ear and ear canal normal.   Nose: Mucosal edema present. Right sinus exhibits no maxillary sinus tenderness. Left sinus exhibits no maxillary sinus tenderness.    Mouth/Throat: Oropharynx is clear and moist.   Cardiovascular: Normal rate and regular rhythm. Exam reveals no friction rub.   No murmur heard.  Pulmonary/Chest: Effort normal and breath sounds normal. She has no wheezes. She has no rales.   Abdominal: Soft. Bowel sounds are normal. There is no tenderness.   Lymphadenopathy:     She has no cervical adenopathy.   Neurological: She is alert.   Skin: Skin is warm and dry. Capillary refill takes less than 2 seconds. No rash noted.   Psychiatric: She has a normal mood and affect.   Vitals reviewed.      Assessment:       1. Upper respiratory tract infection, unspecified type        Plan:         Trini was seen today for uri.    Diagnoses and all orders for this visit:    Upper respiratory tract infection, unspecified type  -     fluticasone (FLONASE) 50 mcg/actuation nasal spray; 1 spray (50 mcg total) by Each Nare route once daily.  -     benzonatate (TESSALON) 100 MG capsule; Take 1 capsule (100 mg total) by mouth 3 (three) times daily as needed.  -     guaiFENesin (MUCINEX) 600 mg 12 hr tablet; Take 2 tablets (1,200 mg total) by mouth 2 (two) times daily. for 10 days      Conservative measures for symptoms relief  RTC if symptoms worsen or fail to improve.     Merced Choi PA-C

## 2019-04-24 NOTE — PATIENT INSTRUCTIONS
START OVER THE COUNTER CLARITIN OR ZYRTEC FOR THE POST NASAL DRIP.     YOU MAY GET OVER THE COUNTER MUCINEX TO HELP WITH THE CONGESTION. I SENT A SCRIPT AS WELL, YOUR INSURANCE MAY RECOMMEND TO OBTAIN OVER THE COUNTER INSTEAD    WARM TEA WITH LEMON AND HONEY.           Viral Upper Respiratory Illness (Adult)  You have a viral upper respiratory illness (URI), which is another term for the common cold. This illness is contagious during the first few days. It is spread through the air by coughing and sneezing. It may also be spread by direct contact (touching the sick person and then touching your own eyes, nose, or mouth). Frequent handwashing will decrease risk of spread. Most viral illnesses go away within 7 to 10 days with rest and simple home remedies. Sometimes the illness may last for several weeks. Antibiotics will not kill a virus, and they are generally not prescribed for this condition.    Home care  · If symptoms are severe, rest at home for the first 2 to 3 days. When you resume activity, don't let yourself get too tired.  · Avoid being exposed to cigarette smoke (yours or others).  · You may use acetaminophen or ibuprofen to control pain and fever, unless another medicine was prescribed. (Note: If you have chronic liver or kidney disease, have ever had a stomach ulcer or gastrointestinal bleeding, or are taking blood-thinning medicines, talk with your healthcare provider before using these medicines.) Aspirin should never be given to anyone under 18 years of age who is ill with a viral infection or fever. It may cause severe liver or brain damage.  · Your appetite may be poor, so a light diet is fine. Avoid dehydration by drinking 6 to 8 glasses of fluids per day (water, soft drinks, juices, tea, or soup). Extra fluids will help loosen secretions in the nose and lungs.  · Over-the-counter cold medicines will not shorten the length of time youre sick, but they may be helpful for the following symptoms:  cough, sore throat, and nasal and sinus congestion. (Note: Do not use decongestants if you have high blood pressure.)  Follow-up care  Follow up with your healthcare provider, or as advised.  When to seek medical advice  Call your healthcare provider right away if any of these occur:  · Cough with lots of colored sputum (mucus)  · Severe headache; face, neck, or ear pain  · Difficulty swallowing due to throat pain  · Fever of 100.4°F (38°C)  Call 911, or get immediate medical care  Call emergency services right away if any of these occur:  · Chest pain, shortness of breath, wheezing, or difficulty breathing  · Coughing up blood  · Inability to swallow due to throat pain  Date Last Reviewed: 9/13/2015  © 0864-1357 [employer]. [address] [place] [address], [place] [address]. All rights reserved. This information is not intended as a substitute for professional medical care. Always follow your healthcare professional's instructions.

## 2019-06-24 ENCOUNTER — OFFICE VISIT (OUTPATIENT)
Dept: INTERNAL MEDICINE | Facility: CLINIC | Age: 59
End: 2019-06-24
Payer: COMMERCIAL

## 2019-06-24 VITALS
OXYGEN SATURATION: 98 % | BODY MASS INDEX: 48 KG/M2 | DIASTOLIC BLOOD PRESSURE: 84 MMHG | HEIGHT: 65 IN | SYSTOLIC BLOOD PRESSURE: 132 MMHG | HEART RATE: 90 BPM | WEIGHT: 288.13 LBS

## 2019-06-24 DIAGNOSIS — L23.1 ALLERGIC CONTACT DERMATITIS DUE TO ADHESIVES: ICD-10-CM

## 2019-06-24 DIAGNOSIS — R10.9 LEFT FLANK PAIN: Primary | ICD-10-CM

## 2019-06-24 DIAGNOSIS — T14.8XXA PULLED MUSCLE: ICD-10-CM

## 2019-06-24 PROCEDURE — 99999 PR PBB SHADOW E&M-EST. PATIENT-LVL III: CPT | Mod: PBBFAC,,, | Performed by: INTERNAL MEDICINE

## 2019-06-24 PROCEDURE — 99214 OFFICE O/P EST MOD 30 MIN: CPT | Mod: 25,S$GLB,, | Performed by: INTERNAL MEDICINE

## 2019-06-24 PROCEDURE — 3008F BODY MASS INDEX DOCD: CPT | Mod: CPTII,S$GLB,, | Performed by: INTERNAL MEDICINE

## 2019-06-24 PROCEDURE — 99999 PR PBB SHADOW E&M-EST. PATIENT-LVL III: ICD-10-PCS | Mod: PBBFAC,,, | Performed by: INTERNAL MEDICINE

## 2019-06-24 PROCEDURE — 96372 THER/PROPH/DIAG INJ SC/IM: CPT | Mod: S$GLB,,, | Performed by: INTERNAL MEDICINE

## 2019-06-24 PROCEDURE — 99214 PR OFFICE/OUTPT VISIT, EST, LEVL IV, 30-39 MIN: ICD-10-PCS | Mod: 25,S$GLB,, | Performed by: INTERNAL MEDICINE

## 2019-06-24 PROCEDURE — 3008F PR BODY MASS INDEX (BMI) DOCUMENTED: ICD-10-PCS | Mod: CPTII,S$GLB,, | Performed by: INTERNAL MEDICINE

## 2019-06-24 PROCEDURE — 96372 PR INJECTION,THERAP/PROPH/DIAG2ST, IM OR SUBCUT: ICD-10-PCS | Mod: S$GLB,,, | Performed by: INTERNAL MEDICINE

## 2019-06-24 RX ORDER — KETOROLAC TROMETHAMINE 30 MG/ML
30 INJECTION, SOLUTION INTRAMUSCULAR; INTRAVENOUS ONCE
Status: COMPLETED | OUTPATIENT
Start: 2019-06-24 | End: 2019-06-24

## 2019-06-24 RX ORDER — CYCLOBENZAPRINE HCL 5 MG
5 TABLET ORAL NIGHTLY PRN
Qty: 7 TABLET | Refills: 0 | Status: SHIPPED | OUTPATIENT
Start: 2019-06-24 | End: 2019-07-01

## 2019-06-24 RX ORDER — TRIAMCINOLONE ACETONIDE 1 MG/G
CREAM TOPICAL 2 TIMES DAILY
Qty: 45 G | Refills: 0 | Status: SHIPPED | OUTPATIENT
Start: 2019-06-24 | End: 2020-01-23

## 2019-06-24 RX ADMIN — KETOROLAC TROMETHAMINE 30 MG: 30 INJECTION, SOLUTION INTRAMUSCULAR; INTRAVENOUS at 12:06

## 2019-06-24 NOTE — PROGRESS NOTES
Two pt identifiers verified (name & ). Pt tolerated well.  Pt rated pain 7/10. Pt advised to remain in clinic for 15 minutes and report any difficulties. Pt to call office in 30 minutes with pain reassessment.

## 2019-06-24 NOTE — PROGRESS NOTES
INTERNAL MEDICINE SAME DAY PRIMARY CARE VISIT NOTE    Subjective:     Chief Complaint: Muscle Pain  since Tuesday     Patient ID: Trini Harris is a 59 y.o. female with morbid obesity, pt of Dr. Ashley Moon, here today for focused same-day primary care visit.    Today, patient with complaint of L sided pain (located just lateral to her L breast) which started last Tuesday.  Says she stretched her L arm out to reach for something and felt immediately pain like a pulled muscle.  No recent trauma or falls. Used a otc topical patch to help with pain relief which she got from someone at work.  Says otc tylenol and aleve have not helped.  Denies chest pain or breast pain.    Past Medical History:  Past Medical History:   Diagnosis Date    Obesity     Osteoarthritis        Home Medications:  Prior to Admission medications    Medication Sig Start Date End Date Taking? Authorizing Provider   fluticasone (FLONASE) 50 mcg/actuation nasal spray 1 spray (50 mcg total) by Each Nare route once daily. 4/24/19  Yes Merced Choi PA-C   cyclobenzaprine (FLEXERIL) 5 MG tablet Take 1 tablet (5 mg total) by mouth nightly as needed for Muscle spasms. 6/24/19 7/1/19  Triny Harris MD       Allergies:  Review of patient's allergies indicates:  No Known Allergies    Social History:  Social History     Tobacco Use    Smoking status: Never Smoker   Substance Use Topics    Alcohol use: Not on file    Drug use: Not on file         Review of Systems   Constitutional: Negative for chills, fatigue and fever.   Respiratory: Negative for cough, chest tightness and shortness of breath.    Cardiovascular: Negative for chest pain.   Gastrointestinal: Negative for abdominal pain and blood in stool.   Genitourinary: Negative for dysuria and frequency.   Musculoskeletal: Positive for myalgias. Negative for arthralgias.           Objective:   /84 (BP Location: Right arm, Patient Position: Sitting, BP Method: Large (Manual))   Pulse 90   Ht  "5' 5" (1.651 m)   Wt 130.7 kg (288 lb 2.3 oz)   LMP 06/12/2015   SpO2 98%   BMI 47.95 kg/m²        General: AAO x3, morbidly obese, appears uncomfortable due to pain  CV: RRR, no m/r/g  Pulm: Lungs CTAB, no crackles, no wheezes  Abd: s/NT/ND +BS  Extremities: no c/c/e  Skin: small raised erythematous areas which appear c/w hives, some areas appear coalesced, noted under L breast and extending laterally to her back.  No pustules or crusting appreciated.  MSK: ttp over area just lateral to the L breast and inferior to the axilla.    Labs:         Assessment/Plan     Trini was seen today for muscle pain.    Diagnoses and all orders for this visit:    Left flank pain  Pulled muscle  Hx appears c/w pulled muscle, pt requesting injection, ok c toradol x 1  Will also give flexeril prn for one week.  Avoid heavy lifting for the next few weeks.  -     ketorolac injection 30 mg  -     cyclobenzaprine (FLEXERIL) 5 MG tablet; Take 1 tablet (5 mg total) by mouth nightly as needed for Muscle spasms.    Allergic contact dermatitis due to adhesives  Appears allergic based on exam, has been using adhesive strips for pain, advised to stop, will give topical steroid for sx.  -     triamcinolone acetonide 0.1% (KENALOG) 0.1 % cream; Apply topically 2 (two) times daily.    RTC prn and with PCP as per routine.    Triny Harris MD  Department of Internal Medicine - [facility]  06/24/2019  "

## 2019-08-15 ENCOUNTER — TELEPHONE (OUTPATIENT)
Dept: ENDOSCOPY | Facility: HOSPITAL | Age: 59
End: 2019-08-15

## 2019-08-15 DIAGNOSIS — Z12.11 SPECIAL SCREENING FOR MALIGNANT NEOPLASMS, COLON: Primary | ICD-10-CM

## 2019-08-15 RX ORDER — POLYETHYLENE GLYCOL 3350, SODIUM SULFATE ANHYDROUS, SODIUM BICARBONATE, SODIUM CHLORIDE, POTASSIUM CHLORIDE 236; 22.74; 6.74; 5.86; 2.97 G/4L; G/4L; G/4L; G/4L; G/4L
4 POWDER, FOR SOLUTION ORAL ONCE
Qty: 4000 ML | Refills: 0 | Status: SHIPPED | OUTPATIENT
Start: 2019-08-15 | End: 2019-08-15

## 2019-08-15 NOTE — TELEPHONE ENCOUNTER
Patient has order for colonoscopy. Left message to call and schedule. Endoscopy schedulers phone number provided.

## 2019-09-16 ENCOUNTER — PATIENT OUTREACH (OUTPATIENT)
Dept: ADMINISTRATIVE | Facility: OTHER | Age: 59
End: 2019-09-16

## 2019-09-16 ENCOUNTER — OFFICE VISIT (OUTPATIENT)
Dept: INTERNAL MEDICINE | Facility: CLINIC | Age: 59
End: 2019-09-16
Payer: COMMERCIAL

## 2019-09-16 VITALS
TEMPERATURE: 99 F | BODY MASS INDEX: 48.12 KG/M2 | DIASTOLIC BLOOD PRESSURE: 85 MMHG | HEART RATE: 75 BPM | HEIGHT: 65 IN | OXYGEN SATURATION: 98 % | WEIGHT: 288.81 LBS | SYSTOLIC BLOOD PRESSURE: 130 MMHG

## 2019-09-16 DIAGNOSIS — G47.9 SLEEP DISTURBANCE: ICD-10-CM

## 2019-09-16 DIAGNOSIS — B02.29 POSTHERPETIC NEURALGIA: Primary | ICD-10-CM

## 2019-09-16 PROCEDURE — 99214 OFFICE O/P EST MOD 30 MIN: CPT | Mod: S$GLB,,, | Performed by: INTERNAL MEDICINE

## 2019-09-16 PROCEDURE — 3008F PR BODY MASS INDEX (BMI) DOCUMENTED: ICD-10-PCS | Mod: CPTII,S$GLB,, | Performed by: INTERNAL MEDICINE

## 2019-09-16 PROCEDURE — 99999 PR PBB SHADOW E&M-EST. PATIENT-LVL IV: CPT | Mod: PBBFAC,,, | Performed by: INTERNAL MEDICINE

## 2019-09-16 PROCEDURE — 99214 PR OFFICE/OUTPT VISIT, EST, LEVL IV, 30-39 MIN: ICD-10-PCS | Mod: S$GLB,,, | Performed by: INTERNAL MEDICINE

## 2019-09-16 PROCEDURE — 3008F BODY MASS INDEX DOCD: CPT | Mod: CPTII,S$GLB,, | Performed by: INTERNAL MEDICINE

## 2019-09-16 PROCEDURE — 99999 PR PBB SHADOW E&M-EST. PATIENT-LVL IV: ICD-10-PCS | Mod: PBBFAC,,, | Performed by: INTERNAL MEDICINE

## 2019-09-16 NOTE — PROGRESS NOTES
"Subjective:       Patient ID: Trini Harris is a 59 y.o. female.    Chief Complaint: Follow-up   This is a 59-year-old who presents today for follow-up she reports that she has a few concerns 1 of which has been having some difficulty with snoring she reports that her sister told her that she needs to get evaluated because she tends to snore and stop breathing at night she feels tired on occasion.  She feels her  has more issues than she does and he is getting treated at this point . sometimes she will feel tired but she is not falling asleep during the day.  She know she snores a lot but recently both her  and her sister had mention there concerns.  She would like to get him to Sleep Clinic for further evaluation.  Recently she also had a rash in discomfort on left side of her abdomen she reports treated for a dermatitis but later developed obvious shingles rash in that area .since then she has had some paresthesias and some discomfort burning sensation or a sensation of Ants biting her.  She denies nausea vomiting or bloating she denies in her abdominal discomfort.  She has been planning to get her colonoscopy done and does did finally schedule that.  She had tried some over-the-counter topical medication which helped with the symptoms and she is using either Tylenol or anti-inflammatory short-term feels that the discomfort she is having isn't improving but she just wanted to get it checked as well.    HPI  Review of Systems   Skin:        Had shingles  Resolving rash  Lingering burning discomfort left abdomen        Objective:     Blood pressure 130/85, pulse 75, temperature 98.5 °F (36.9 °C), height 5' 5" (1.651 m), weight 131 kg (288 lb 12.8 oz), last menstrual period 06/12/2015, SpO2 98 %.    Physical Exam   Constitutional: No distress.   HENT:   Head: Normocephalic.   Mouth/Throat: Oropharynx is clear and moist.   Eyes: No scleral icterus.   Neck: Neck supple.   Cardiovascular: Normal rate, " regular rhythm and normal heart sounds. Exam reveals no gallop and no friction rub.   No murmur heard.  Pulmonary/Chest: Effort normal and breath sounds normal. No respiratory distress.   Abdominal: Soft. Bowel sounds are normal. She exhibits no mass. There is no tenderness.   Scarring prior shingles rash    Musculoskeletal: She exhibits no edema.   Neurological: She is alert.   Skin: No erythema.   Psychiatric: She has a normal mood and affect.   Vitals reviewed.      Assessment:       1. Postherpetic neuralgia    2. Sleep disturbance        Plan:       Trini was seen today for follow-up.    Diagnoses and all orders for this visit:    Postherpetic neuralgia  Discussed options she feels her symptoms are improving we discussed the options of gabapentin Neurontin or topical over-the-counter Lidoderm patch which she has already been using with some relief initially.  She feels her symptoms are getting better but she will call if she changes her mind would like to consider starting gabapentin risks benefits reviewed    Sleep disturbance  Patient has concerns about the possibility of apnea referral to the Sleep Clinic for further evaluation referral placed for scheduling  -     Ambulatory referral to Sleep Disorders    She continues to work on dietary measures to get weight

## 2019-12-11 ENCOUNTER — TELEPHONE (OUTPATIENT)
Dept: SURGERY | Facility: CLINIC | Age: 59
End: 2019-12-11

## 2019-12-11 NOTE — TELEPHONE ENCOUNTER
----- Message from Aspen Zazueta sent at 12/11/2019  2:31 PM CST -----  Contact: pt   Pt is calling to see if her prep for her colonoscopy want sent.  Call back # [phone]   [facility] [address] [address]  Phone: [phone] Fax: [phone]

## 2019-12-13 ENCOUNTER — PATIENT MESSAGE (OUTPATIENT)
Dept: ADMINISTRATIVE | Facility: OTHER | Age: 59
End: 2019-12-13

## 2019-12-13 ENCOUNTER — PATIENT OUTREACH (OUTPATIENT)
Dept: ADMINISTRATIVE | Facility: OTHER | Age: 59
End: 2019-12-13

## 2019-12-13 DIAGNOSIS — Z12.31 ENCOUNTER FOR SCREENING MAMMOGRAM FOR MALIGNANT NEOPLASM OF BREAST: Primary | ICD-10-CM

## 2019-12-16 ENCOUNTER — HOSPITAL ENCOUNTER (OUTPATIENT)
Facility: HOSPITAL | Age: 59
Discharge: HOME | End: 2019-12-16
Attending: COLON & RECTAL SURGERY | Admitting: COLON & RECTAL SURGERY
Payer: COMMERCIAL

## 2019-12-16 ENCOUNTER — ANESTHESIA (OUTPATIENT)
Dept: ENDOSCOPY | Facility: HOSPITAL | Age: 59
End: 2019-12-16
Payer: COMMERCIAL

## 2019-12-16 ENCOUNTER — ANESTHESIA EVENT (OUTPATIENT)
Dept: ENDOSCOPY | Facility: HOSPITAL | Age: 59
End: 2019-12-16
Payer: COMMERCIAL

## 2019-12-16 VITALS
WEIGHT: 285 LBS | TEMPERATURE: 98 F | BODY MASS INDEX: 47.48 KG/M2 | RESPIRATION RATE: 18 BRPM | SYSTOLIC BLOOD PRESSURE: 160 MMHG | OXYGEN SATURATION: 98 % | HEIGHT: 65 IN | DIASTOLIC BLOOD PRESSURE: 89 MMHG | HEART RATE: 65 BPM

## 2019-12-16 DIAGNOSIS — Z12.11 ENCOUNTER FOR SCREENING COLONOSCOPY: ICD-10-CM

## 2019-12-16 PROCEDURE — 37000009 HC ANESTHESIA EA ADD 15 MINS: Performed by: COLON & RECTAL SURGERY

## 2019-12-16 PROCEDURE — 63600175 PHARM REV CODE 636 W HCPCS: Performed by: COLON & RECTAL SURGERY

## 2019-12-16 PROCEDURE — 37000008 HC ANESTHESIA 1ST 15 MINUTES: Performed by: COLON & RECTAL SURGERY

## 2019-12-16 PROCEDURE — 45380 COLONOSCOPY AND BIOPSY: CPT | Mod: 33,,, | Performed by: COLON & RECTAL SURGERY

## 2019-12-16 PROCEDURE — 88305 TISSUE EXAM BY PATHOLOGIST: ICD-10-PCS | Mod: 26,,, | Performed by: PATHOLOGY

## 2019-12-16 PROCEDURE — 27201012 HC FORCEPS, HOT/COLD, DISP: Performed by: COLON & RECTAL SURGERY

## 2019-12-16 PROCEDURE — E9220 PRA ENDO ANESTHESIA: HCPCS | Mod: 33,,, | Performed by: NURSE ANESTHETIST, CERTIFIED REGISTERED

## 2019-12-16 PROCEDURE — 88305 TISSUE EXAM BY PATHOLOGIST: CPT | Performed by: PATHOLOGY

## 2019-12-16 PROCEDURE — E9220 PRA ENDO ANESTHESIA: ICD-10-PCS | Mod: 33,,, | Performed by: NURSE ANESTHETIST, CERTIFIED REGISTERED

## 2019-12-16 PROCEDURE — 45380 COLONOSCOPY AND BIOPSY: CPT | Performed by: COLON & RECTAL SURGERY

## 2019-12-16 PROCEDURE — 88305 TISSUE EXAM BY PATHOLOGIST: CPT | Mod: 26,,, | Performed by: PATHOLOGY

## 2019-12-16 PROCEDURE — 45380 PR COLONOSCOPY,BIOPSY: ICD-10-PCS | Mod: 33,,, | Performed by: COLON & RECTAL SURGERY

## 2019-12-16 PROCEDURE — 63600175 PHARM REV CODE 636 W HCPCS: Performed by: NURSE ANESTHETIST, CERTIFIED REGISTERED

## 2019-12-16 RX ORDER — PROPOFOL 10 MG/ML
VIAL (ML) INTRAVENOUS
Status: DISCONTINUED | OUTPATIENT
Start: 2019-12-16 | End: 2019-12-16

## 2019-12-16 RX ORDER — SODIUM CHLORIDE 9 MG/ML
INJECTION, SOLUTION INTRAVENOUS CONTINUOUS
Status: DISCONTINUED | OUTPATIENT
Start: 2019-12-16 | End: 2019-12-16 | Stop reason: HOSPADM

## 2019-12-16 RX ORDER — PROPOFOL 10 MG/ML
VIAL (ML) INTRAVENOUS CONTINUOUS PRN
Status: DISCONTINUED | OUTPATIENT
Start: 2019-12-16 | End: 2019-12-16

## 2019-12-16 RX ADMIN — PROPOFOL 20 MG: 10 INJECTION, EMULSION INTRAVENOUS at 09:12

## 2019-12-16 RX ADMIN — SODIUM CHLORIDE: 0.9 INJECTION, SOLUTION INTRAVENOUS at 08:12

## 2019-12-16 RX ADMIN — PROPOFOL 80 MG: 10 INJECTION, EMULSION INTRAVENOUS at 09:12

## 2019-12-16 RX ADMIN — PROPOFOL 150 MCG/KG/MIN: 10 INJECTION, EMULSION INTRAVENOUS at 09:12

## 2019-12-16 NOTE — H&P
Endoscopy H&P    Procedure : Colonoscopy      asymptomatic screening exam      Past Medical History:   Diagnosis Date    Obesity     Osteoarthritis      Past Surgical History:   Procedure Laterality Date     SECTION       No current facility-administered medications on file prior to encounter.      No current outpatient medications on file prior to encounter.     Review of patient's allergies indicates:  No Known Allergies          Review of Systems -ROS:  GENERAL: No fever, chills, fatigability or weight loss.  CHEST: Denies OCONNOR, cyanosis, wheezing, cough and sputum production.  CARDIOVASCULAR: Denies chest pain, PND, orthopnea or reduced exercise tolerance.   Musculoskeletal ROS: negative for - gait disturbance or joint pain  Neurological ROS: negative for - confusion or memory loss        Physical Exam:  General: well developed, well nourished, no distress  Head: normocephalic  Neck: supple, symmetrical, trachea midline  Lungs:  clear to auscultation bilaterally and normal respiratory effort  Heart: regular rate and rhythm, S1, S2 normal, no murmur, rub or gallop and regular rate and rhythm  Abdomen: soft, non-tender non-distented; bowel sounds normal; no masses,  no organomegaly  Extremities: no cyanosis or edema, or clubbing       Moderate Sedation (choice): Mallampati Score 1    ASA : II    IMP: asymptomatic screening exam    Plan:     Colonoscopy with Moderate sedation.  I have explained the procedure including indications, alternatives, expected outcomes and potential complications. The patient appears to understand and gives informed consent. The patient is medically ready for the procedure.      Prisca Walls MD  General Surgery PGY V  Beeper: [phone]

## 2019-12-16 NOTE — PLAN OF CARE
Plan of care discussed with patient. All questions and concerns addressed and answered. Free of pain or distress. PIV removed without complications. VS stable. Procedure report and discharge instructions gone over and patient aware of restrictions and when to resume medications. Patient in agreement.    [id number] Dr. Fernández at bedside

## 2019-12-16 NOTE — [employer] PATIENT INSTRUCTIONS
Discharge Summary/Instructions after an Endoscopic Procedure  Patient Name: Trini Harris  Patient MRN: [id number]  Patient YOB: 1960  Monday, December 16, 2019  Janes Fernández MD  RESTRICTIONS:  During your procedure today, you received medications for sedation.  These   medications may affect your judgment, balance and coordination.  Therefore,   for 24 hours, you have the following restrictions:   - DO NOT drive a car, operate machinery, make legal/financial decisions,   sign important papers or drink alcohol.    ACTIVITY:  Today: no heavy lifting, straining or running due to procedural   sedation/anesthesia.  The following day: return to full activity including work.  DIET:  Eat and drink normally unless instructed otherwise.     TREATMENT FOR COMMON SIDE EFFECTS:  - Mild abdominal pain, nausea, belching, bloating or excessive gas:  rest,   eat lightly and use a heating pad.  - Sore Throat: treat with throat lozenges and/or gargle with warm salt   water.  - Because air was used during the procedure, expelling large amounts of air   from your rectum or belching is normal.  - If a bowel prep was taken, you may not have a bowel movement for 1-3 days.    This is normal.  SYMPTOMS TO WATCH FOR AND REPORT TO YOUR PHYSICIAN:  1. Abdominal pain or bloating, other than gas cramps.  2. Chest pain.  3. Back pain.  4. Signs of infection such as: chills or fever occurring within 24 hours   after the procedure.  5. Rectal bleeding, which would show as bright red, maroon, or black stools.   (A tablespoon of blood from the rectum is not serious, especially if   hemorrhoids are present.)  6. Vomiting.  7. Weakness or dizziness.  GO DIRECTLY TO THE NEAREST EMERGENCY ROOM IF YOU HAVE ANY OF THE FOLLOWING:      Difficulty breathing              Chills and/or fever over 101 F   Persistent vomiting and/or vomiting blood   Severe abdominal pain   Severe chest pain   Black, tarry stools   Bleeding- more than one  tablespoon   Any other symptom or condition that you feel may need urgent attention  Your doctor recommends these additional instructions:  If any biopsies were taken, your doctors clinic will contact you in 1 to 2   weeks with any results.  - Patient has a contact number available for emergencies.  The signs and   symptoms of potential delayed complications were discussed with the   patient.  Return to normal activities tomorrow.  Written discharge   instructions were provided to the patient.   - Discharge patient to home (ambulatory).   - Resume regular diet indefinitely.   - Repeat colonoscopy in 5-10 years for surveillance.   - Continue present medications.  For questions, problems or results please call your physician - Janes Fernández MD at Work:  [phone].  [facility], EMERGENCY ROOM PHONE NUMBER: [phone]  IF A COMPLICATION OR EMERGENCY SITUATION ARISES AND YOU ARE UNABLE TO REACH   YOUR PHYSICIAN - GO DIRECTLY TO THE EMERGENCY ROOM.  Janes Fernández MD  12/16/2019 9:37:15 AM  This report has been verified and signed electronically.  [employer]

## 2019-12-16 NOTE — ANESTHESIA PREPROCEDURE EVALUATION
12/16/2019  Trini Harris is a 59 y.o., female.    Anesthesia Evaluation    I have reviewed the Patient Summary Reports.    I have reviewed the Nursing Notes.   I have reviewed the Medications.     Review of Systems  Anesthesia Hx:  No problems with previous Anesthesia Denies Hx of Anesthetic complications    Hematology/Oncology:  Hematology Normal   Oncology Normal     EENT/Dental:EENT/Dental Normal   Cardiovascular:  Cardiovascular Normal     Pulmonary:  Pulmonary Normal    Renal/:  Renal/ Normal     Hepatic/GI:  Hepatic/GI Normal    Musculoskeletal:   Arthritis     Neurological:  Neurology Normal    Endocrine:  Endocrine Normal    Dermatological:  Skin Normal    Psych:  Psychiatric Normal           Physical Exam  General:  Well nourished    Airway/Jaw/Neck:  Airway Findings: Mouth Opening: Normal Tongue: Normal  General Airway Assessment: Adult  Mallampati: II  TM Distance: Normal, at least 6 cm  Jaw/Neck Findings:  Neck ROM: Normal ROM     Eyes/Ears/Nose:  EYES/EARS/NOSE FINDINGS: Normal   Dental:  Dental Findings: In tact   Chest/Lungs:  Chest/Lungs Clear    Heart/Vascular:  Heart Findings: Normal Heart murmur: negative Vascular Findings: Normal    Abdomen:  Abdomen Findings: Normal    Musculoskeletal:  Musculoskeletal Findings: Normal   Skin:  Skin Findings: Normal    Mental Status:  Mental Status Findings: Normal        Anesthesia Plan  Type of Anesthesia, risks & benefits discussed:  Anesthesia Type:  general  Patient's Preference: General  Intra-op Monitoring Plan: standard ASA monitors  Intra-op Monitoring Plan Comments:   Post Op Pain Control Plan:   Post Op Pain Control Plan Comments:   Induction:   IV  Beta Blocker:  Patient is not currently on a Beta-Blocker (No further documentation required).       Informed Consent: Patient understands risks and agrees with Anesthesia plan.  Questions  answered. Anesthesia consent signed with patient.  ASA Score: 2     Day of Surgery Review of History & Physical: I have interviewed and examined the patient. I have reviewed the patient's H&P dated:  There are no significant changes.  H&P update referred to the surgeon.         Ready For Surgery From Anesthesia Perspective.

## 2019-12-16 NOTE — TRANSFER OF CARE
"Anesthesia Transfer of Care Note    Patient: Trini Harris    Procedure(s) Performed: Procedure(s) (LRB):  COLONOSCOPY (N/A)    Patient location: GI    Anesthesia Type: general    Transport from OR: Transported from OR on room air with adequate spontaneous ventilation    Post pain: adequate analgesia    Post assessment: no apparent anesthetic complications and tolerated procedure well    Post vital signs: stable    Level of consciousness: responds to stimulation and sedated    Nausea/Vomiting: no nausea/vomiting    Complications: none    Transfer of care protocol was followed      Last vitals:   Visit Vitals  BP (!) 152/65 (BP Location: Left arm, Patient Position: Lying)   Pulse 78   Temp 36.4 °C (97.5 °F) (Temporal)   Resp 18   Ht 5' 5" (1.651 m)   Wt 129.3 kg (285 lb)   LMP 06/12/2015   SpO2 99%   Breastfeeding? No   BMI 47.43 kg/m²     "

## 2019-12-16 NOTE — DISCHARGE INSTRUCTIONS
Colonoscopy     A camera attached to a flexible tube with a viewing lens is used to take video pictures.     Colonoscopy is a test to view the inside of your lower digestive tract (colon and rectum). Sometimes it can show the last part of the small intestine (ileum). During the test, small pieces of tissue may be removed for testing. This is called a biopsy. Small growths, such as polyps, may also be removed.   Why is colonoscopy done?  The test is done to help look for colon cancer. And it can help find the source of abdominal pain, bleeding, and changes in bowel habits. It may be needed once a year, depending on factors such as your:  · Age  · Health history  · Family health history  · Symptoms  · Results from any prior colonoscopy  Risks and possible complications  These include:  · Bleeding               · A puncture or tear in the colon   · Risks of anesthesia  · A cancer lesion not being seen  Getting ready   To prepare for the test:  · Talk with your healthcare provider about the risks of the test (see below). Also ask your healthcare provider about alternatives to the test.  · Tell your healthcare provider about any medicines you take. Also tell him or her about any health conditions you may have.  · Make sure your rectum and colon are empty for the test. Follow the diet and bowel prep instructions exactly. If you dont, the test may need to be rescheduled.  · Plan for a friend or family member to drive you home after the test.     Colonoscopy provides an inside view of the entire colon.     You may discuss the results with your doctor right away or at a future visit.  During the test   The test is usually done in the hospital on an outpatient basis. This means you go home the same day. The procedure takes about 30 minutes. During that time:  · You are given relaxing (sedating) medicine through an IV line. You may be drowsy, or fully asleep.  · The healthcare provider will first give you a physical exam to  check for anal and rectal problems.  · Then the anus is lubricated and the scope inserted.  · If you are awake, you may have a feeling similar to needing to have a bowel movement. You may also feel pressure as air is pumped into the colon. Its OK to pass gas during the procedure.  · Biopsy, polyp removal, or other treatments may be done during the test.  After the test   You may have gas right after the test. It can help to try to pass it to help prevent later bloating. Your healthcare provider may discuss the results with you right away. Or you may need to schedule a follow-up visit to talk about the results. After the test, you can go back to your normal eating and other activities. You may be tired from the sedation and need to rest for a few hours.  Date Last Reviewed: 11/1/2016 © 2000-2017 [employer]. [address] [place] [address], [place] [address]. All rights reserved. This information is not intended as a substitute for professional medical care. Always follow your healthcare professional's instructions.

## 2019-12-16 NOTE — ANESTHESIA POSTPROCEDURE EVALUATION
Anesthesia Post Evaluation    Patient: Trini Harris    Procedure(s) Performed: Procedure(s) (LRB):  COLONOSCOPY (N/A)    Final Anesthesia Type: general    Patient location during evaluation: GI PACU  Patient participation: Yes- Able to Participate  Level of consciousness: awake and alert  Post-procedure vital signs: reviewed and stable  Pain management: adequate  Airway patency: patent    PONV status at discharge: No PONV  Anesthetic complications: no      Cardiovascular status: blood pressure returned to baseline  Respiratory status: unassisted  Hydration status: euvolemic  Follow-up not needed.          Vitals Value Taken Time   /89 12/16/2019 10:12 AM   Temp 36.6 °C (97.9 °F) 12/16/2019  9:42 AM   Pulse 65 12/16/2019 10:12 AM   Resp 18 12/16/2019 10:12 AM   SpO2 98 % 12/16/2019 10:12 AM         Event Time     Out of Recovery 10:40:15          Pain/Mindy Score: Mindy Score: 10 (12/16/2019 10:12 AM)

## 2019-12-24 LAB
FINAL PATHOLOGIC DIAGNOSIS: NORMAL
GROSS: NORMAL

## 2020-01-23 ENCOUNTER — HOSPITAL ENCOUNTER (OUTPATIENT)
Dept: RADIOLOGY | Facility: HOSPITAL | Age: 60
Discharge: HOME | End: 2020-01-23
Attending: INTERNAL MEDICINE
Payer: COMMERCIAL

## 2020-01-23 ENCOUNTER — HOSPITAL ENCOUNTER (OUTPATIENT)
Dept: CARDIOLOGY | Facility: CLINIC | Age: 60
Discharge: HOME | End: 2020-01-23
Payer: COMMERCIAL

## 2020-01-23 ENCOUNTER — OFFICE VISIT (OUTPATIENT)
Dept: INTERNAL MEDICINE | Facility: CLINIC | Age: 60
End: 2020-01-23
Payer: COMMERCIAL

## 2020-01-23 ENCOUNTER — PATIENT MESSAGE (OUTPATIENT)
Dept: ADMINISTRATIVE | Facility: OTHER | Age: 60
End: 2020-01-23

## 2020-01-23 VITALS
HEIGHT: 65 IN | OXYGEN SATURATION: 97 % | SYSTOLIC BLOOD PRESSURE: 138 MMHG | HEART RATE: 82 BPM | WEIGHT: 291.25 LBS | DIASTOLIC BLOOD PRESSURE: 92 MMHG | BODY MASS INDEX: 48.53 KG/M2

## 2020-01-23 DIAGNOSIS — I10 ESSENTIAL HYPERTENSION: ICD-10-CM

## 2020-01-23 DIAGNOSIS — G47.33 OBSTRUCTIVE SLEEP APNEA SYNDROME: ICD-10-CM

## 2020-01-23 LAB
BILIRUB UR QL STRIP: NEGATIVE
CLARITY UR REFRACT.AUTO: CLEAR
COLOR UR AUTO: YELLOW
GLUCOSE UR QL STRIP: NEGATIVE
HGB UR QL STRIP: NEGATIVE
KETONES UR QL STRIP: NEGATIVE
LEUKOCYTE ESTERASE UR QL STRIP: NEGATIVE
NITRITE UR QL STRIP: NEGATIVE
PH UR STRIP: 6 [PH] (ref 5–8)
PROT UR QL STRIP: NEGATIVE
SP GR UR STRIP: 1.01 (ref 1–1.03)
URN SPEC COLLECT METH UR: NORMAL

## 2020-01-23 PROCEDURE — 81003 URINALYSIS AUTO W/O SCOPE: CPT

## 2020-01-23 PROCEDURE — 71046 X-RAY EXAM CHEST 2 VIEWS: CPT | Mod: TC

## 2020-01-23 PROCEDURE — 71046 X-RAY EXAM CHEST 2 VIEWS: CPT | Mod: 26,,, | Performed by: RADIOLOGY

## 2020-01-23 PROCEDURE — 93010 EKG 12-LEAD: ICD-10-PCS | Mod: S$GLB,,, | Performed by: INTERNAL MEDICINE

## 2020-01-23 PROCEDURE — 71046 XR CHEST PA AND LATERAL: ICD-10-PCS | Mod: 26,,, | Performed by: RADIOLOGY

## 2020-01-23 PROCEDURE — 3075F PR MOST RECENT SYSTOLIC BLOOD PRESS GE 130-139MM HG: ICD-10-PCS | Mod: CPTII,S$GLB,, | Performed by: INTERNAL MEDICINE

## 2020-01-23 PROCEDURE — 99214 PR OFFICE/OUTPT VISIT, EST, LEVL IV, 30-39 MIN: ICD-10-PCS | Mod: S$GLB,,, | Performed by: INTERNAL MEDICINE

## 2020-01-23 PROCEDURE — 93010 ELECTROCARDIOGRAM REPORT: CPT | Mod: S$GLB,,, | Performed by: INTERNAL MEDICINE

## 2020-01-23 PROCEDURE — 3075F SYST BP GE 130 - 139MM HG: CPT | Mod: CPTII,S$GLB,, | Performed by: INTERNAL MEDICINE

## 2020-01-23 PROCEDURE — 99214 OFFICE O/P EST MOD 30 MIN: CPT | Mod: S$GLB,,, | Performed by: INTERNAL MEDICINE

## 2020-01-23 PROCEDURE — 3008F PR BODY MASS INDEX (BMI) DOCUMENTED: ICD-10-PCS | Mod: CPTII,S$GLB,, | Performed by: INTERNAL MEDICINE

## 2020-01-23 PROCEDURE — 3080F PR MOST RECENT DIASTOLIC BLOOD PRESSURE >= 90 MM HG: ICD-10-PCS | Mod: CPTII,S$GLB,, | Performed by: INTERNAL MEDICINE

## 2020-01-23 PROCEDURE — 3008F BODY MASS INDEX DOCD: CPT | Mod: CPTII,S$GLB,, | Performed by: INTERNAL MEDICINE

## 2020-01-23 PROCEDURE — 99999 PR PBB SHADOW E&M-EST. PATIENT-LVL IV: CPT | Mod: PBBFAC,,, | Performed by: INTERNAL MEDICINE

## 2020-01-23 PROCEDURE — 93005 EKG 12-LEAD: ICD-10-PCS | Mod: S$GLB,,, | Performed by: INTERNAL MEDICINE

## 2020-01-23 PROCEDURE — 99999 PR PBB SHADOW E&M-EST. PATIENT-LVL IV: ICD-10-PCS | Mod: PBBFAC,,, | Performed by: INTERNAL MEDICINE

## 2020-01-23 PROCEDURE — 3080F DIAST BP >= 90 MM HG: CPT | Mod: CPTII,S$GLB,, | Performed by: INTERNAL MEDICINE

## 2020-01-23 PROCEDURE — 93005 ELECTROCARDIOGRAM TRACING: CPT | Mod: S$GLB,,, | Performed by: INTERNAL MEDICINE

## 2020-01-23 RX ORDER — HYDROCHLOROTHIAZIDE 25 MG/1
25 TABLET ORAL DAILY
Qty: 30 TABLET | Refills: 3 | Status: SHIPPED | OUTPATIENT
Start: 2020-01-23 | End: 2020-03-04 | Stop reason: SDUPTHER

## 2020-01-24 ENCOUNTER — PATIENT OUTREACH (OUTPATIENT)
Dept: OTHER | Facility: OTHER | Age: 60
End: 2020-01-24

## 2020-01-24 DIAGNOSIS — I10 ESSENTIAL HYPERTENSION: Primary | ICD-10-CM

## 2020-01-24 NOTE — PROGRESS NOTES
Digital Medicine: Clinician Introduction    Trini Harris is a 59 y.o. female who is newly enrolled in the Digital Medicine Clinic.    The following information was reviewed and updated:  Preferred pharmacy   [facility] - [address] [address]  Phone: [phone] Fax: [phone]      Patient prefers a 90 days supply.     Review of patient's allergies indicates:  No Known Allergies    I spoke with the patient today for the initial enrollment call. Patient states she started the HCTZ yesterday. She is taking her BP meds in the morning while sitting at the table.    Diet: some salt, wants to do better    Exercise: walking    Pertinent PMH: KODY, uses CPAP 8hrs a night. Obesity      The history is provided by the patient. No  was used.     HYPERTENSION  Our goal is to get BP to consistently below 130/80mmHg and make the process convenient so patient can avoid extra trips to the office. Getting your blood pressure below 130/80mmHg (definition of control) will reduce your risk for heart attack, kidney failure, stroke and death (as well as kidney failure, eye disease, & dementia)      Reviewed non-pharmacologic therapies and impact on BP      Explained that we expect patient to obtain several blood pressures per week at random times of day.  Instructed patient not to allow anyone else to use phone and monitoring device.  Confirmed appropriate BP monitoring technique.      Explained to patient that the digital medicine team is not available for emergencies.  Patient will call [facility] on-call ([phone] or [phone]) or [phone] if needed.    Patient's BP goal is 130/80. Patients BP average is 152/101 mmHg, which is above goal, per 2017 ACC/AHA Hypertension Guidelines.      Medication Change: new medications    Med Review complete.    Allergies reviewed.      Last 5 Patient Entered Readings                                       Current 30 Day Average: 152/101     Recent Readings 1/23/2020    SBP (mmHg) 152    DBP (mmHg) 101    Pulse 81           BMP  Lab Results   Component Value Date     01/23/2020    K 4.5 01/23/2020     01/23/2020    CO2 27 01/23/2020    BUN 11 01/23/2020    CREATININE 0.9 01/23/2020    CALCIUM 9.4 01/23/2020    ANIONGAP 7 (L) 01/23/2020    ESTGFRAFRICA >60.0 01/23/2020    EGFRNONAA >60.0 01/23/2020         INTERVENTION(S)  reviewed appropriate dose schedule, recommended diet modifications, recommended physical activity, reviewed monitoring technique and encouragement/support    PLAN  patient verbalizes understanding, patient amenable to changes and additional monitoring needed    BP above goal Avr 152/101 but only has 1 reading from the [facility]    HCTZ started yesterday. Will get BMP in 2 weeks. Has PCP appt on 2/6      There are no preventive care reminders to display for this patient.    Current Medication Regimen:  Hypertension Medications             hydroCHLOROthiazide (HYDRODIURIL) 25 MG tablet Take 1 tablet (25 mg total) by mouth once daily.            Reviewed the importance of self-monitoring, medication adherence, and that the health  can be used as a resource for lifestyle modifications to help reduce or maintain a healthy lifestyle.    Sent link to [facility] webpages and my contact information via [employer] for future questions. Follow up scheduled.             Sleep Apnea Screening  Patient previously diagnosed with KODY     She reports she is currently using CPAP         Medication Adherence Screening   She misses doses: never

## 2020-01-24 NOTE — LETTER
January 24, 2020     Trini Harris  [address] [address]       Dear Trini,    Welcome to [facility]! Our goal is to make care effective, proactive and convenient by using data you send us from home to better treat your chronic conditions.              My name is Gary Mccormack, and I am your dedicated Digital Medicine clinician. As an expert in medication management, I will help ensure that the medications you are taking continue to provide the intended benefits and help you reach your goals. You can reach me directly at [phone] or by sending me a message directly through your [facility] account.      I am Naseem Liao and I will be your health . My job is to help you identify lifestyle changes to improve your disease control. We will talk about nutrition, exercise, and other ways you may be able to adjust your current habits to better your health. Additionally, we will help ensure you are completing the tests and screenings that are necessary to help manage your conditions. You can reach me directly at [phone] or by sending me a message directly through your [facility] account.    Most importantly, YOU are at the center of this team. Together, we will work to improve your overall health and encourage you to meet your goals for a healthier lifestyle.     What we expect from YOU:  · Please take frequent home blood pressure measurements. We ask that you take at least 1 blood pressure reading per week, but more information will better help us get you know you. Be sure you rest for a few minutes before taking the reading in a quiet, comfortable place.     Be available to receive phone calls or [employer] messages, when appropriate, from your care team. Please let us know if there are any specific days or times that work best for us to reach you via phone.     Complete routine tests and screenings. Dont worry, we will help keep you on track!           What you should  expect from your Digital Medicine Care Team:   We will work with you to create a personalized plan of care and provide you with encouragement and education, including regarding lifestyle changes, that could help you manage your disease states.     We will adjust your current medications, if needed, and continue to monitor your long-term progress.     We will provide you and your physician with monthly progress reports after you have been in the program for more than 30 days.     We will send you reminders through [employer] and text messages to help ensure you do not miss any testing deadlines to help manage your disease states.    You will be able to reach us by phone or through your [employer] account by clicking our names under Care Team on the right side of the home screen.    I look forward to working with you to achieve your blood pressure goals!    We look forward to working with you to help manage your health,    Sincerely,    Your Digital Medicine Team    Please visit our websites to learn more:   · Hypertension: [email or web address]      Remember, we are not available for emergencies. If you have an emergency, please contact your doctors office directly or call [facility] on-call ([phone] or [phone]) or [phone].

## 2020-01-24 NOTE — PROGRESS NOTES
Digital Medicine Program Enrollment      Our goal is to get BP to consistently below 130/80mmHg and make the process convenient so patient can avoid extra trips to the office. Getting your blood pressure below 130/80mmHg (definition of control) will reduce your risk for heart attack, kidney failure, stroke and death (as well as kidney failure, eye disease, & dementia)      Reviewed that the Digital Medicine care team - consisting of a clinician and a health  - will follow the most current evidence-based national guidelines for treating your condition.  The health  will focus on lifestyle modifications and motivation while the clinician will focus on medication therapy.  The care team will review all data on a regular basis and reach out as needed.      Explained that one of the key parts of the program is communication with the care team.  Asked patient to respond to outreach attempts and complete questionnaires.  Stressed importance of medication adherence.      Explained that we expect patient to obtain several blood pressures per week at random times of day.  Instructed patient not to allow anyone else to use phone and monitoring device.  Confirmed appropriate BP monitoring technique.      Explained to patient that the digital medicine team is not available for emergencies.  Patient will call [facility] on-call ([phone] or [phone]) or [phone] if needed.

## 2020-01-24 NOTE — LETTER
January 24, 2020     Trini Harris  [address] [address]       Dear Trini,    Welcome to [facility]! Our goal is to make care effective, proactive and convenient by using data you send us from home to better treat your chronic conditions.              My name is Gary Mccormack, and I am your dedicated Digital Medicine clinician. As an expert in medication management, I will help ensure that the medications you are taking continue to provide the intended benefits and help you reach your goals. You can reach me directly at [phone] or by sending me a message directly through your [facility] account.      I am Naseem Liao and I will be your health . My job is to help you identify lifestyle changes to improve your disease control. We will talk about nutrition, exercise, and other ways you may be able to adjust your current habits to better your health. Additionally, we will help ensure you are completing the tests and screenings that are necessary to help manage your conditions. You can reach me directly at [phone] or by sending me a message directly through your [facility] account.    Most importantly, YOU are at the center of this team. Together, we will work to improve your overall health and encourage you to meet your goals for a healthier lifestyle.     What we expect from YOU:  · Please take frequent home blood pressure measurements. We ask that you take at least 1 blood pressure reading per week, but more information will better help us get you know you. Be sure you rest for a few minutes before taking the reading in a quiet, comfortable place.     Be available to receive phone calls or [employer] messages, when appropriate, from your care team. Please let us know if there are any specific days or times that work best for us to reach you via phone.     Complete routine tests and screenings. Dont worry, we will help keep you on track!           What you should  expect from your Digital Medicine Care Team:   We will work with you to create a personalized plan of care and provide you with encouragement and education, including regarding lifestyle changes, that could help you manage your disease states.     We will adjust your current medications, if needed, and continue to monitor your long-term progress.     We will provide you and your physician with monthly progress reports after you have been in the program for more than 30 days.     We will send you reminders through [employer] and text messages to help ensure you do not miss any testing deadlines to help manage your disease states.    You will be able to reach us by phone or through your [facility] account by clicking our names under Care Team on the right side of the home screen.    I look forward to working with you to achieve your blood pressure goals!    We look forward to working with you to help manage your health,    Sincerely,    Your Digital Medicine Team    Please visit our websites to learn more:   · Hypertension: [email or web address]      Remember, we are not available for emergencies. If you have an emergency, please contact your doctors office directly or call [facility] on-call ([phone] or [phone]) or [phone].

## 2020-01-24 NOTE — PROGRESS NOTES
"Subjective:      Patient ID: Trini Harris is a 59 y.o. female.    Chief Complaint: Hypertension (New)    HPI:  Hypertension   This is a new problem. The current episode started in the past 7 days. The problem has been waxing and waning since onset. The problem is controlled. Associated symptoms include headaches, palpitations and shortness of breath. Pertinent negatives include no anxiety, blurred vision, chest pain, malaise/fatigue, neck pain, orthopnea, peripheral edema, PND or sweats. There are no associated agents to hypertension. Risk factors for coronary artery disease include obesity, post-menopausal state and sedentary lifestyle. Past treatments include nothing. Compliance problems include diet and exercise.    Patient is accompanied by her daughter Kimberly who is a nurse.  Patient Active Problem List   Diagnosis    Obesity    Osteoarthritis    Encounter for screening colonoscopy    Essential hypertension    Obstructive sleep apnea syndrome     Past Medical History:   Diagnosis Date    Obesity     Osteoarthritis      Past Surgical History:   Procedure Laterality Date     SECTION      COLONOSCOPY N/A 2019    Procedure: COLONOSCOPY;  Surgeon: Janes Fernández MD;  Location: [address]);  Service: Endoscopy;  Laterality: N/A;     Family History   Problem Relation Age of Onset    Diabetes Mother     Heart disease Mother     Diabetes Father     Hypertension Father     Hyperlipidemia Father     Kidney disease Sister      Review of Systems   Constitutional: Negative for malaise/fatigue.   Eyes: Negative for blurred vision.   Respiratory: Positive for shortness of breath.    Cardiovascular: Positive for palpitations. Negative for chest pain, orthopnea and PND.   Musculoskeletal: Negative for neck pain.   Neurological: Positive for headaches.     Objective:     Vitals:    20 1006   BP: (!) 138/92   Pulse: 82   SpO2: 97%   Weight: 132.1 kg (291 lb 3.6 oz)   Height: 5' 5" " (1.651 m)   PainSc: 0-No pain     Body mass index is 48.46 kg/m².  Physical Exam   Constitutional: She is oriented to person, place, and time. She appears well-developed and well-nourished. No distress.   Neck: Carotid bruit is not present. No thyromegaly present.   Cardiovascular: Normal rate, regular rhythm and normal heart sounds. PMI is not displaced.   Pulmonary/Chest: Effort normal and breath sounds normal. No respiratory distress.   Abdominal: Soft. Bowel sounds are normal. She exhibits no distension. There is no tenderness.   Musculoskeletal: She exhibits no edema.   Neurological: She is alert and oriented to person, place, and time.     Assessment:     1. Essential hypertension    2. Obstructive sleep apnea syndrome      Plan:   Trini was seen today for hypertension.    Diagnoses and all orders for this visit:    Essential hypertension  Comments:  Will begin hctz, monitor on Digital Medicine and FU with Dr. Moon. Diet, exercise, control of sleep apnea discussed  Orders:  -     Hypertension Digital Medicine ([facility]) Enrollment Order  -     Hypertension Digital Medicine ([facility]): Assign Onboarding Questionnaires  -     X-Ray Chest PA And Lateral; Future  -     EKG 12-lead; Future  -     CBC auto differential; Future  -     Comprehensive metabolic panel; Future  -     Lipid panel; Future  -     Urinalysis    Obstructive sleep apnea syndrome  Comments:  Patient currently on CPAP    Other orders  -     hydroCHLOROthiazide (HYDRODIURIL) 25 MG tablet; Take 1 tablet (25 mg total) by mouth once daily.        Problem List Items Addressed This Visit     Essential hypertension    Relevant Orders    Hypertension Digital Medicine ([facility]) Enrollment Order (Completed)    Hypertension Digital Medicine ([facility]): Assign Onboarding Questionnaires (Completed)    X-Ray Chest PA And Lateral (Completed)    EKG 12-lead (Completed)    CBC auto differential (Completed)    Comprehensive metabolic panel (Completed)    Lipid panel  (Completed)    Urinalysis (Completed)    Obstructive sleep apnea syndrome        Orders Placed This Encounter   Procedures    X-Ray Chest PA And Lateral     Standing Status:   Future     Number of Occurrences:   1     Standing Expiration Date:   3/23/2020    CBC auto differential     Standing Status:   Future     Number of Occurrences:   1     Standing Expiration Date:   3/23/2020    Comprehensive metabolic panel     Standing Status:   Future     Number of Occurrences:   1     Standing Expiration Date:   3/23/2020    Lipid panel     Standing Status:   Future     Number of Occurrences:   1     Standing Expiration Date:   3/23/2020    Urinalysis     Order Specific Question:   Collection Type     Answer:   Urine, Unspecified    Hypertension Digital Medicine (HDMP) Enrollment Order     I. PURPOSE  To provide [facility] patients with innovative, specialized blood pressure monitoring and optimal dosing of antihypertensive therapy to improve health outcomes and decrease microvascular and macrovascular complications    II. GOALS  To maintain a systematic, coordinated and cost-effective process to monitor blood pressure in patients with hypertension  To attain and maintain optimal antihypertensive therapy while ensuring patient safety using the most recent evidence-based guidelines for the management of hypertension as reported by AHA/ACC in 2017.   Provide consultative services to providers, patients and caregivers regarding optimal antihypertensive therapy  To improve patient/caregiver understanding and compliance related to antihypertensive therapies by providing continuous patient and caregiver education about their prescribed medications and associated disease state  To provide education, guidance and reinforcement regarding lifestyle modifications including weight loss, adopting and maintaining the Dietary Approaches to Stop Hypertension or DASH diet, sodium restriction, physical activity, and  moderation of alcohol consumption to patients and caregivers  Allow providers increased availability of clinic time for direct patient care   To provide patient care and education within an interdisciplinary framework and enhance partnering with other members of health care team  Improve continuity of care for high blood pressure patients and improve patient engagement  Collect and utilize pharmacy related outcomes to improve quality of patient care    III. COLLABORATIVE PRACTICE AGREEMENT    A.  Under this collaborative practice agreement, an [facility] pharmacist according to and in compliance with [place] Board of Pharmacy Title 46, Part LIII, section 523 and [place] Board of Medical Examiners definition of the Collaborative Drug Therapy Management (CDTM) may initiate, implement, alter and monitor a therapeutic drug plan intended to manage antihypertensive therapy.  Services offered by the hypertension pharmacy specialist may include education on disease state and lifestyle modification, in addition to the drug therapy services listed above. Written and audio/visual educational materials and patient specific information may be provided to improve quality of care.    B. Primary Collaborating Physician:    Primary Physician: Elliot Ndiaye M.D., [facility], Cardiology  License number: [id number]  CDTM number:  [id number]  Telephone number: [phone]   E-mail address: [email or web address]  Emergency Contact Information: [phone]    Back-Up Physician:  Carlitos Childers M.D.  Cardiology  License number:  [id number]  CDTM number:  [id number]  Telephone number:  [phone]  E-mail address: [email or web address]  Emergency Contact Information: [phone]    C.  Pharmacists:   Each of the following pharmacists will serve as the primary pharmacist on CDTM and any pharmacist may serve as the secondary pharmacist for another pharmacists agreement.  Each of the pharmacists listed below has a Pharm.D  degree, with completion of an accredited pharmacy practice residency and as well as experience with managing patients along with a physician.  The outpatient monitoring service will be provided under the Cardiology Department at [facility] [place] location in [place] at [address] [place] [address]. The pharmacist will contact patients via telephone from a remote location.    Pharmacist: Ekaterina Ramos PharmD  This pharmacist has completed a pharmacy practice residency and has practiced clinical pharmacy for 7 years. She has experience in the areas of cardiology, acute care, and managed care. She started a pharmacist run hypertension clinic at [facility] during her residency and was published in The American Journal of Health-System Pharmacists.     [place] Pharmacist License Number: [id number]  CDTM number:  [id number]  Contact Number:  [phone]  Contact E-mail: [email or web address]  Emergency Contact Number:  [phone]    Pharmacist:  Barbara Reeves PharmD  This pharmacist has completed a pharmacy practice residency and has practiced clinical pharmacy for 17 years in the areas of cardiology, geriatric medicine, anticoagulation management, retail and ambulatory care.  She served as a pharmacy practice clinical preceptor and lead pharmacist in anticoagulation clinic for 10 years.  [place] Pharmacist License # [id number]  CDTM number:  [id number]  Contact Number:  [phone]  Contact E-mail:  [email or web address]   Emergency Contact Number:  [phone]    Pharmacist: Susy Celeste PharmD, BCACP, CDE  This pharmacist has completed a pharmacy practice residency with emphasis in ambulatory care and has practiced clinical pharmacy for 8 years in the areas of dyslipidemia, hypertension, diabetes, and anticoagulation. She is also a Certified Diabetes Educator.      [place] Pharmacist License # [id number]  CDTM number: [id number]  Contact number:  [phone]  Contact E-mail:  [email or web address]  Emergency Contact Number: [phone]    Pharmacist: Bharti Staton PharmD  This pharmacist started practicing at [facility] in 2011 following her one year residency at [facility]. She serves as an Adjunct Clinical  in the College of Pharmacy at [employer]. She served as the lead pharmacist for the Coumadin Clinic team for 4 years.   [place] Pharmacist License: [id number]  CDTM number: [id number]  Contact number: [phone]  Contact E-mail: [email or web address]   Emergency Contact Number: [phone]    Pharmacist:  Ramonita Rodriguez PharmD  This pharmacist has completed a pharmacy practice residency (PGY-1) and has practiced clinical pharmacy for 16  years in the areas of heart and abdominal transplant, retail and ambulatory care.  She was directly involved in the care of congestive heart failure, left ventricular assist device and heart transplant patients from [id number].  She is currently practicing as a digital medicine clinical specialist in heart failure.    [place] Pharmacist License # [id number]  CDTM number:  [id number]  Contact Number:  [phone]  Contact E-mail:  [email or web address]   Emergency Contact Number:  [phone]    Pharmacist: Bharti Huffman PharmD  This pharmacist obtained her Pharm.D. from [employer], and has completed an Ambulatory Care Pharmacy Practice residency at [facility]. She has practiced clinical pharmacy for 9  years and has experience in the areas of Hypertension and Diabetes.      [place] Pharmacist License: [id number]  CDTM Number: [id number]  Telephone number: [phone]  E-mail: [email or web address]  Emergency Contact Number: [phone]      Pharmacist: Kaylyn Kwon PharmD  This pharmacist has completed a pharmacy practice residency with an emphasis in ambulatory care and has practiced clinical pharmacy for  one year. She has experience in the areas of diabetes, hypertension and dyslipidemia.   [place] Pharmacist License: [id number]  CDTM Number: [id number]  Contact Number: [phone]  Contact Email: [email or web address]   Emergency Contact: [phone]    Pharmacist: Demetra Mccarthy, PharmD, BCPS  This pharmacist has completed a pharmacy practice residency with an emphasis in ambulatory care and is a Board Certified Pharmacotherapy Specialist (BCPS). She has practiced clinical pharmacy for  years in areas of ambulatory care, internal medicine, cardiology and specialty pharmacy.    [place] Pharmacist License Number: [id number]  CDTM number:  [id number]  Contact Number:  [phone]  Contact E-mail:  [email or web address]   Emergency Contact Number:  [phone]    D.  Eligible Patients  Patients whose antihypertensive therapy is managed under this agreement must have a diagnosis of hypertension as documented in the patient record, and have established care with a provider within [facility]. All aspects of the patients hypertension medication management will be followed in collaboration with the physician treating the patients hypertension.  The patient will be seen by his or her physician per their discretion or by the recommendation by the hypertension pharmacist.  The patients case may be reviewed with clinic medical personnel as needed, but will be reported at least every 30 days to the collaborating physician regarding the patients drug therapy management. All decisions made by the pharmacist will be recorded in [facility] and are readily available for physician review. All issues outside of the scope of antihypertensive therapy shall be reported to the collaborating physician.     E.  Medications in CDTM  This collaborative practice proposal involves the management of patients who are receiving antihypertensive therapy, specifically, thiazide-type diuretics,  angiotensin-converting enzyme inhibtors (ACE-I), angiotensin receptor blockers (ARB), calcium channel blockers (CCB), beta-blockers, loop diuretics, potassium-sparing diuretics, potassium supplementation, aldosterone antagonists, direct renin inhibitors, alpha-1 receptor blockers, central alpha-2 agonists, direct arterial vasodilators and peripheral adrenergic antoagonists, or those patients in which hypertension is controlled by lifestyle modifcation alone.      F.  Clinical Procedure  All patients will be notified that a hypertension CDTM exists.  A signed physician order will be required for each patient to be enrolled into the hypertension CDTM.  Informed consent and an electronic signature will be obtained from each patient and documented in the patients record.  This patient signature will be valid for 1 year, requiring a new physician order and patient consent yearly.  The patient must also be enrolled in the electornic communication program ([facility]) to be elegible for the monitoring program.  The following management plan will be utilized for CDTM:    Patients must submit blood pressures at a minimum of once weekly from the patient- purchased wireless blood pressure cuff. Patients who fail to comply with this requirement are subject to removal from [facility].   Evaluate the change in blood pressure, if any, from previous measurements performed on the same cuff and arm.  Determine and document contributing factors for blood pressure change.    Review initial drug therapy made by clinic physician upon program enrollment with patient to ensure compliance.    Identify target blood pressure based on age and comorbidities. Therapeutic changes will be made in collaboration with PharmD and collaborating physician based on the AHA/ACC 2017 guidelines for the treatment of hypertension.  Guide drug optimization based on patient response at 2-4 week intervals until control is achieved.  The PharmD will continue to  monitor blood pressure and make adjustments to hypertension medications in order to achieve optimal blood pressure.   Order follow up labs when changing or adding ACE inhibitors and diuretics.    Refer to hypertension specialist if  blood pressure goals cannot be achieved using the noted algorithm.  Notify physician with specific problems or request clinic visit if deemed necessary.  Document antihypertensive therapy changes, interventions, and outcomes in EMR.   Provide patient/caregiver continuous education or reinforcement regarding hypertension management,  medications and lifestyle modifications.    Laboratory Tests  Pharmacists will be authorized to order and evaluate laboratory tests directly related to the disease specific drug therapy being managed. Pharmacists will also be authorized to order additional specific labs based on patient clinical presentation or description. Pharmacists may also review other lab data available in the patient record which may be necessary for the evaluation and assessment of the impact on antihypertensive therapy (i.e. drug interaction, disease interaction, etc.).     b.  Documentation   Documentation of patient encounters and lab results will be permanently placed in the [facility].  Laboratory results will automatically populate prompting review and assessment of each patient.  Laboratory results obtained from outside laboratories will be entered into EMR manually.  This documentation will include lab values, medication changes, identification and assessment of adverse events related to therapy, antihypertensive medication dose adjustments, therapeutic management plan and follow up as well as any other information given to the patient during the telemedicine visit.    c.     1.   will be carried out through quarterly reports tracking following statistics:   a. Percent of patients requiring a change to antihypertensive medications   b. Number  of emergency visits due to hypertensive urgency or emergency, hypotension or medication side effects for participating patients  Percent of patients who are controlled (BP <130/80 mmHg)  and uncontrolled (BP>130 mmHg)     2.  Patient specific quality indicators will be identified through quarterly review of the following data:   a.  Average change in blood pressure after a dose adjument by the hypertension pharmacist    3.  A random sample of patient records shall be reviewed by the primary physician quarterly to ensure adherence by the hypertension clinical specialists to the collaborative practice agreement.     4.   measures will be reported to Pharmacy & Therapeutics Committee yearly.     References:    JAYSON Blair, et al. 2017. 2017. Guidelines for the Prevention, Detection, Evaluation and Management of High Blood Pressure in Adults.      Order Specific Question:   BP Control Goal     Answer:   Current (2017) AHA Guidelines    EKG 12-lead     Standing Status:   Future     Number of Occurrences:   1     Standing Expiration Date:   1/22/2021     Order Specific Question:   Diagnosis     Answer:   Essential hypertension [275758]     Follow up in about 2 weeks (around 2/6/2020) for FU with Dr. Moon for .     Medication List           Accurate as of January 23, 2020 11:59 PM. If you have any questions, ask your nurse or doctor.               START taking these medications    hydroCHLOROthiazide 25 MG tablet  Commonly known as:  HYDRODIURIL  Take 1 tablet (25 mg total) by mouth once daily.  Started by:  Ursula Quinonez MD           Where to Get Your Medications      These medications were sent to [facility] [place] [id number]    Phone:  [phone]   · hydroCHLOROthiazide 25 MG tablet

## 2020-01-24 NOTE — LETTER
January 24, 2020     Trini Harris  [address] [address]       Dear Trini,    Welcome to [facility]! Our goal is to make care effective, proactive and convenient by using data you send us from home to better treat your chronic conditions.              My name is Gary Mccormack, and I am your dedicated Digital Medicine clinician. As an expert in medication management, I will help ensure that the medications you are taking continue to provide the intended benefits and help you reach your goals. You can reach me directly at [phone] or by sending me a message directly through your [facility] account.      I am Naseem Liao and I will be your health . My job is to help you identify lifestyle changes to improve your disease control. We will talk about nutrition, exercise, and other ways you may be able to adjust your current habits to better your health. Additionally, we will help ensure you are completing the tests and screenings that are necessary to help manage your conditions. You can reach me directly at [phone] or by sending me a message directly through your [facility] account.    Most importantly, YOU are at the center of this team. Together, we will work to improve your overall health and encourage you to meet your goals for a healthier lifestyle.     What we expect from YOU:  · Please take frequent home blood pressure measurements. We ask that you take at least 1 blood pressure reading per week, but more information will better help us get you know you. Be sure you rest for a few minutes before taking the reading in a quiet, comfortable place.     Be available to receive phone calls or [employer] messages, when appropriate, from your care team. Please let us know if there are any specific days or times that work best for us to reach you via phone.     Complete routine tests and screenings. Dont worry, we will help keep you on track!           What you should  expect from your Digital Medicine Care Team:   We will work with you to create a personalized plan of care and provide you with encouragement and education, including regarding lifestyle changes, that could help you manage your disease states.     We will adjust your current medications, if needed, and continue to monitor your long-term progress.     We will provide you and your physician with monthly progress reports after you have been in the program for more than 30 days.     We will send you reminders through [employer] and text messages to help ensure you do not miss any testing deadlines to help manage your disease states.    You will be able to reach us by phone or through your [employer] account by clicking our names under Care Team on the right side of the home screen.    I look forward to working with you to achieve your blood pressure goals!    We look forward to working with you to help manage your health,    Sincerely,    Your Digital Medicine Team    Please visit our websites to learn more:   · Hypertension: [email or web address]      Remember, we are not available for emergencies. If you have an emergency, please contact your doctors office directly or call [facility] on[facility] ([phone] or [phone]) or [phone].

## 2020-01-24 NOTE — LETTER
January 24, 2020     Trini Harris  [address] [facility] [address]       Dear Trini,    Welcome to [facility]! Our goal is to make care effective, proactive and convenient by using data you send us from home to better treat your chronic conditions.              My name is Gary Mccormack, and I am your dedicated Digital Medicine clinician. As an expert in medication management, I will help ensure that the medications you are taking continue to provide the intended benefits and help you reach your goals. You can reach me directly at [phone] or by sending me a message directly through your [facility] account.      I am Naseem Liao and I will be your health . My job is to help you identify lifestyle changes to improve your disease control. We will talk about nutrition, exercise, and other ways you may be able to adjust your current habits to better your health. Additionally, we will help ensure you are completing the tests and screenings that are necessary to help manage your conditions. You can reach me directly at [phone] or by sending me a message directly through your [facility] account.    Most importantly, YOU are at the center of this team. Together, we will work to improve your overall health and encourage you to meet your goals for a healthier lifestyle.     What we expect from YOU:  · Please take frequent home blood pressure measurements. We ask that you take at least 1 blood pressure reading per week, but more information will better help us get you know you. Be sure you rest for a few minutes before taking the reading in a quiet, comfortable place.     Be available to receive phone calls or [employer] messages, when appropriate, from your care team. Please let us know if there are any specific days or times that work best for us to reach you via phone.     Complete routine tests and screenings. Dont worry, we will help keep you on track!           What you should  expect from your Digital Medicine Care Team:   We will work with you to create a personalized plan of care and provide you with encouragement and education, including regarding lifestyle changes, that could help you manage your disease states.     We will adjust your current medications, if needed, and continue to monitor your long-term progress.     We will provide you and your physician with monthly progress reports after you have been in the program for more than 30 days.     We will send you reminders through [employer] and text messages to help ensure you do not miss any testing deadlines to help manage your disease states.    You will be able to reach us by phone or through your "[employer] account by clicking our names under Care Team on the right side of the home screen.    I look forward to working with you to achieve your blood pressure goals!    We look forward to working with you to help manage your health,    Sincerely,    Your Digital Medicine Team    Please visit our websites to learn more:   · Hypertension: [email or web address]      Remember, we are not available for emergencies. If you have an emergency, please contact your doctors office directly or call [facility] ([phone] or [phone]) or [phone].

## 2020-01-27 ENCOUNTER — PATIENT OUTREACH (OUTPATIENT)
Dept: OTHER | Facility: OTHER | Age: 60
End: 2020-01-27

## 2020-02-06 ENCOUNTER — OFFICE VISIT (OUTPATIENT)
Dept: INTERNAL MEDICINE | Facility: CLINIC | Age: 60
End: 2020-02-06
Payer: COMMERCIAL

## 2020-02-06 ENCOUNTER — LAB VISIT (OUTPATIENT)
Dept: LAB | Facility: HOSPITAL | Age: 60
End: 2020-02-06
Payer: COMMERCIAL

## 2020-02-06 VITALS
DIASTOLIC BLOOD PRESSURE: 78 MMHG | SYSTOLIC BLOOD PRESSURE: 128 MMHG | HEIGHT: 65 IN | WEIGHT: 289 LBS | BODY MASS INDEX: 48.15 KG/M2

## 2020-02-06 DIAGNOSIS — G47.33 OBSTRUCTIVE SLEEP APNEA SYNDROME: ICD-10-CM

## 2020-02-06 DIAGNOSIS — E66.9 OBESITY WITHOUT SERIOUS COMORBIDITY, UNSPECIFIED CLASSIFICATION, UNSPECIFIED OBESITY TYPE: ICD-10-CM

## 2020-02-06 DIAGNOSIS — I10 ESSENTIAL HYPERTENSION: ICD-10-CM

## 2020-02-06 DIAGNOSIS — Z86.010 HX OF COLONIC POLYPS: ICD-10-CM

## 2020-02-06 DIAGNOSIS — I10 ESSENTIAL HYPERTENSION: Primary | ICD-10-CM

## 2020-02-06 PROBLEM — Z86.0100 HX OF COLONIC POLYPS: Status: ACTIVE | Noted: 2020-02-06

## 2020-02-06 LAB
ANION GAP SERPL CALC-SCNC: 8 MMOL/L (ref 8–16)
BUN SERPL-MCNC: 16 MG/DL (ref 6–20)
CALCIUM SERPL-MCNC: 9.7 MG/DL (ref 8.7–10.5)
CHLORIDE SERPL-SCNC: 105 MMOL/L (ref 95–110)
CO2 SERPL-SCNC: 31 MMOL/L (ref 23–29)
CREAT SERPL-MCNC: 0.9 MG/DL (ref 0.5–1.4)
EST. GFR  (AFRICAN AMERICAN): >60 ML/MIN/1.73 M^2
EST. GFR  (NON AFRICAN AMERICAN): >60 ML/MIN/1.73 M^2
GLUCOSE SERPL-MCNC: 83 MG/DL (ref 70–110)
POTASSIUM SERPL-SCNC: 4 MMOL/L (ref 3.5–5.1)
SODIUM SERPL-SCNC: 144 MMOL/L (ref 136–145)

## 2020-02-06 PROCEDURE — 3008F BODY MASS INDEX DOCD: CPT | Mod: CPTII,S$GLB,, | Performed by: INTERNAL MEDICINE

## 2020-02-06 PROCEDURE — 99999 PR PBB SHADOW E&M-EST. PATIENT-LVL III: CPT | Mod: PBBFAC,,, | Performed by: INTERNAL MEDICINE

## 2020-02-06 PROCEDURE — 3074F PR MOST RECENT SYSTOLIC BLOOD PRESSURE < 130 MM HG: ICD-10-PCS | Mod: CPTII,S$GLB,, | Performed by: INTERNAL MEDICINE

## 2020-02-06 PROCEDURE — 99214 OFFICE O/P EST MOD 30 MIN: CPT | Mod: S$GLB,,, | Performed by: INTERNAL MEDICINE

## 2020-02-06 PROCEDURE — 3008F PR BODY MASS INDEX (BMI) DOCUMENTED: ICD-10-PCS | Mod: CPTII,S$GLB,, | Performed by: INTERNAL MEDICINE

## 2020-02-06 PROCEDURE — 3078F DIAST BP <80 MM HG: CPT | Mod: CPTII,S$GLB,, | Performed by: INTERNAL MEDICINE

## 2020-02-06 PROCEDURE — 80048 BASIC METABOLIC PNL TOTAL CA: CPT

## 2020-02-06 PROCEDURE — 99999 PR PBB SHADOW E&M-EST. PATIENT-LVL III: ICD-10-PCS | Mod: PBBFAC,,, | Performed by: INTERNAL MEDICINE

## 2020-02-06 PROCEDURE — 36415 COLL VENOUS BLD VENIPUNCTURE: CPT

## 2020-02-06 PROCEDURE — 99214 PR OFFICE/OUTPT VISIT, EST, LEVL IV, 30-39 MIN: ICD-10-PCS | Mod: S$GLB,,, | Performed by: INTERNAL MEDICINE

## 2020-02-06 PROCEDURE — 3074F SYST BP LT 130 MM HG: CPT | Mod: CPTII,S$GLB,, | Performed by: INTERNAL MEDICINE

## 2020-02-06 PROCEDURE — 3078F PR MOST RECENT DIASTOLIC BLOOD PRESSURE < 80 MM HG: ICD-10-PCS | Mod: CPTII,S$GLB,, | Performed by: INTERNAL MEDICINE

## 2020-02-06 NOTE — PROGRESS NOTES
"Subjective:       Patient ID: Trini Harris is a 59 y.o. female.    Chief Complaint: Follow-up   is this is a 59-year-old who presents today for follow-up.  Patient reports that she has been doing well she came in for a checkup with Dr. Quinonez in her pressure was running high she had been having issues with sleep apnea since our last visit and it getting with outlying sleep doctor was diagnosed with sleep apnea and was placed on CPAP machine she has been using it regularly and feels that has helped she is sleeping better is on her energy level has improved.    She has been trying to work on getting her weight down over time but monitoring her blood pressure as well which had been up and down on occasion she had started having a lot more elevations in her readings as and was placed on hydrochlorothiazide and enrolled in the digital hypertension program for which she has been following.  Her pressure has been better she is continuing to trend down and she is started a walking program she reports she feels she has lost is several lb as well.  She is walking now at lunch during work she works [facility] [employer] [facility].  Is she has found incorporating exercise to be helpful and is also pleased with the digital program who is keeping her on task with her healthier diet and low-sodium diet.  She has blood work ordered for recheck of her electrolytes planned with the digital program today.    She did get her colonoscopy scheduled since last visit which was normal she had polyps in her repeat in 5 years recommended.  She does her mammograms with her outlying gynecologist and is up-to-date will try to have her records sent as well as    HPI  Review of Systems   Respiratory: Negative for chest tightness and shortness of breath.    Cardiovascular: Negative for leg swelling.   Psychiatric/Behavioral:        Sleeping better no on cpap        Objective:     .Blood pressure 128/78, height 5' 5" (1.651 m), weight 131.1 kg (289 lb 0.4 oz), " last menstrual period 06/12/2015.    Physical Exam   Constitutional: No distress.   HENT:   Head: Normocephalic.   Mouth/Throat: Oropharynx is clear and moist.   Eyes: No scleral icterus.   Neck: Neck supple.   Cardiovascular: Normal rate, regular rhythm and normal heart sounds. Exam reveals no gallop and no friction rub.   No murmur heard.  Pulmonary/Chest: Effort normal and breath sounds normal. No respiratory distress.   Abdominal: Soft. Bowel sounds are normal. She exhibits no mass. There is no tenderness.   Musculoskeletal: She exhibits no edema.   Neurological: She is alert.   Skin: No erythema.   Vitals reviewed.      Assessment:       1. Essential hypertension    2. Hx of colonic polyps    3. Obstructive sleep apnea syndrome    4. Obesity without serious comorbidity, unspecified classification, unspecified obesity type        Plan:       Trini was seen today for follow-up.    Diagnoses and all orders for this visit:    Essential hypertension  Blood pressure acceptable continue current regimen  She has had some ups and Downs but is working on lifestyle measures as well she is following with digital program for now will maintain her hydrochlorothiazide continue to work with the digital program if no improvement in her home numbers over time may need additional regimen but today's number was acceptable home numbers have been trending downward continually.    Hx of colonic polyps  History of colon polyps she had a colonoscopy showed polyp in repeat recommended in 5 years    Obstructive sleep apnea syndrome  Is history of she has been following with an outlying Sleep Clinic is using her CPAP machine regularly and feels it has been helpful as she will try to get a copy of those records as well but she is seeing an outlying sleep specialist    Obesity without serious comorbidity, unspecified classification, unspecified obesity type  She is working on exercising regularly and improved diet to get her weight down  over time     She declines immunizations  Is follow-up 4 months sooner if concern  Recent labs and test reviewed

## 2020-02-07 ENCOUNTER — PATIENT OUTREACH (OUTPATIENT)
Dept: OTHER | Facility: OTHER | Age: 60
End: 2020-02-07

## 2020-02-07 DIAGNOSIS — I10 ESSENTIAL HYPERTENSION: Primary | ICD-10-CM

## 2020-03-04 RX ORDER — HYDROCHLOROTHIAZIDE 25 MG/1
25 TABLET ORAL DAILY
Qty: 90 TABLET | Refills: 1 | Status: SHIPPED | OUTPATIENT
Start: 2020-03-04 | End: 2020-10-05

## 2020-03-04 NOTE — PROGRESS NOTES
Digital Medicine: Health  Follow-Up    The history is provided by the patient. No  was used.     Follow Up  Patient reported that she had some [place] fried chicken with some coleslaw and then she had some bread later that evening. She stated that she shouldn't be eating after 6 o'clock.    Asked the patient why her blood pressure readings are elevated. She stated that she is waiting on Dr. Moon to refill her medication that she was taking. Patient reported that the medication she has been out of is the hydroCHLOROthiazide. She expressed that she has not had her medication for about 7 days.    Patient stated that when she takes her medication her blood pressure readings are in range and under control. Reviewed proper technique with the patient as well.    Intervention/Plan    There are no preventive care reminders to display for this patient.    Last 5 Patient Entered Readings                                      Current 30 Day Average: 138/88     Recent Readings 3/3/2020 3/3/2020 3/2/2020 3/2/2020 3/1/2020    SBP (mmHg) 154 156 160 160 135    DBP (mmHg) 93 88 96 94 78    Pulse 73 71 73 73 82             Screenings    SDOH

## 2020-03-18 ENCOUNTER — PATIENT OUTREACH (OUTPATIENT)
Dept: OTHER | Facility: OTHER | Age: 60
End: 2020-03-18

## 2020-03-20 NOTE — PROGRESS NOTES
Digital Medicine: Clinician Follow-Up    I spoke with the patient today for follow-up.  She says that she has not gotten to the pharmacy to start the hydrochlorothiazide, so readings are without taking any medicine.  I did confirm that it is ready for pickup and she says she will try and start taking it today or tomorrow.    The history is provided by the patient. No  was used.     Follow Up  Follow-up reason(s): reading review      Readings are trending up due to medication adherence.      BMP  Lab Results   Component Value Date     02/06/2020    K 4.0 02/06/2020     02/06/2020    CO2 31 (H) 02/06/2020    BUN 16 02/06/2020    CREATININE 0.9 02/06/2020    CALCIUM 9.7 02/06/2020    ANIONGAP 8 02/06/2020    ESTGFRAFRICA >60.0 02/06/2020    EGFRNONAA >60.0 02/06/2020         INTERVENTION(S)  reviewed appropriate dose schedule and encouragement/support    PLAN  patient amenable to changes and additional monitoring needed    Avr blood pressure 147/89 above goal    Advised she start taking the hydrochlorothiazide 25mg      There are no preventive care reminders to display for this patient.    Last 5 Patient Entered Readings                                      Current 30 Day Average: 147/89     Recent Readings 3/19/2020 3/18/2020 3/17/2020 3/15/2020 3/13/2020    SBP (mmHg) 151 140 150 149 145    DBP (mmHg) 95 92 95 83 85    Pulse 78 74 88 75 75             Hypertension Medications             hydroCHLOROthiazide (HYDRODIURIL) 25 MG tablet Take 1 tablet (25 mg total) by mouth once daily.                         Medication Affordability Screening    This is preventing medication adherence.

## 2020-04-03 ENCOUNTER — PATIENT OUTREACH (OUTPATIENT)
Dept: OTHER | Facility: OTHER | Age: 60
End: 2020-04-03

## 2020-04-03 NOTE — PROGRESS NOTES
Digital Medicine: Clinician Follow-Up    I spoke with the patient today to follow-up on her blood pressure readings.  At my last outreach she started started taking hydrochlorothiazide 25.  Since then her readings have been trending downward.  She denies muscle cramps or side effects from taking the hydrochlorothiazide.    The history is provided by the patient. No  was used.     Follow Up  Follow-up reason(s): reading review      Readings are trending down   Patient started new medication.    Date:  3/20/2020  Is patient tolerating med change?:  Yes      INTERVENTION(S)  reviewed appropriate dose schedule and encouragement/support    PLAN  patient verbalizes understanding and additional monitoring needed    Average blood pressure 142/88, above goal but trending down    No med changes.  Follow-up in 3-6 months      There are no preventive care reminders to display for this patient.    Last 5 Patient Entered Readings                                      Current 30 Day Average: 142/88     Recent Readings 4/2/2020 4/2/2020 3/31/2020 3/30/2020 3/29/2020    SBP (mmHg) 127 116 136 122 136    DBP (mmHg) 82 75 87 76 89    Pulse 78 91 100 92 84             Hypertension Medications             hydroCHLOROthiazide (HYDRODIURIL) 25 MG tablet Take 1 tablet (25 mg total) by mouth once daily.                 Screenings

## 2020-06-11 ENCOUNTER — OFFICE VISIT (OUTPATIENT)
Dept: INTERNAL MEDICINE | Facility: CLINIC | Age: 60
End: 2020-06-11
Payer: COMMERCIAL

## 2020-06-11 VITALS
HEIGHT: 65 IN | HEART RATE: 92 BPM | SYSTOLIC BLOOD PRESSURE: 130 MMHG | WEIGHT: 290.81 LBS | BODY MASS INDEX: 48.45 KG/M2 | DIASTOLIC BLOOD PRESSURE: 78 MMHG | OXYGEN SATURATION: 97 %

## 2020-06-11 DIAGNOSIS — Z86.010 HX OF COLONIC POLYPS: ICD-10-CM

## 2020-06-11 DIAGNOSIS — G47.33 OBSTRUCTIVE SLEEP APNEA SYNDROME: ICD-10-CM

## 2020-06-11 DIAGNOSIS — I10 ESSENTIAL HYPERTENSION: Primary | ICD-10-CM

## 2020-06-11 PROCEDURE — 99999 PR PBB SHADOW E&M-EST. PATIENT-LVL IV: CPT | Mod: PBBFAC,,, | Performed by: INTERNAL MEDICINE

## 2020-06-11 PROCEDURE — 3008F PR BODY MASS INDEX (BMI) DOCUMENTED: ICD-10-PCS | Mod: CPTII,S$GLB,, | Performed by: INTERNAL MEDICINE

## 2020-06-11 PROCEDURE — 99214 PR OFFICE/OUTPT VISIT, EST, LEVL IV, 30-39 MIN: ICD-10-PCS | Mod: S$GLB,,, | Performed by: INTERNAL MEDICINE

## 2020-06-11 PROCEDURE — 99999 PR PBB SHADOW E&M-EST. PATIENT-LVL IV: ICD-10-PCS | Mod: PBBFAC,,, | Performed by: INTERNAL MEDICINE

## 2020-06-11 PROCEDURE — 3008F BODY MASS INDEX DOCD: CPT | Mod: CPTII,S$GLB,, | Performed by: INTERNAL MEDICINE

## 2020-06-11 PROCEDURE — 3075F SYST BP GE 130 - 139MM HG: CPT | Mod: CPTII,S$GLB,, | Performed by: INTERNAL MEDICINE

## 2020-06-11 PROCEDURE — 3078F PR MOST RECENT DIASTOLIC BLOOD PRESSURE < 80 MM HG: ICD-10-PCS | Mod: CPTII,S$GLB,, | Performed by: INTERNAL MEDICINE

## 2020-06-11 PROCEDURE — 3078F DIAST BP <80 MM HG: CPT | Mod: CPTII,S$GLB,, | Performed by: INTERNAL MEDICINE

## 2020-06-11 PROCEDURE — 3075F PR MOST RECENT SYSTOLIC BLOOD PRESS GE 130-139MM HG: ICD-10-PCS | Mod: CPTII,S$GLB,, | Performed by: INTERNAL MEDICINE

## 2020-06-11 PROCEDURE — 99214 OFFICE O/P EST MOD 30 MIN: CPT | Mod: S$GLB,,, | Performed by: INTERNAL MEDICINE

## 2020-07-24 ENCOUNTER — PATIENT OUTREACH (OUTPATIENT)
Dept: OTHER | Facility: OTHER | Age: 60
End: 2020-07-24

## 2020-07-24 NOTE — PROGRESS NOTES
7/24 plan - have her take readings earlier in the day, which I suspect will her lower. Consider adding amlodipine if not change

## 2020-09-04 ENCOUNTER — PATIENT OUTREACH (OUTPATIENT)
Dept: OTHER | Facility: OTHER | Age: 60
End: 2020-09-04

## 2020-09-04 NOTE — PROGRESS NOTES
Digital Medicine: Clinician Follow-Up    I spoke with the patient today to follow up on her elevated BP reading. She attributed her elevated readings this week to an episode of vertigo that just ended yesterday. She is feeling better today but has not gotten a chance to take a reading.      Follow-up reason(s): routine follow up.     Hypertension    Readings are trending up   Patient is not experiencing signs/symptoms of hypotension.  Patient is not experiencing signs/symptoms of hypertension.          Last 5 Patient Entered Readings                                      Current 30 Day Average: 141/89     Recent Readings 9/3/2020 9/3/2020 9/3/2020 9/3/2020 9/1/2020    SBP (mmHg) 149 149 128 128 147    DBP (mmHg) 97 97 85 85 95    Pulse 81 81 68 68 66             Screenings    ASSESSMENT(S)  Patients BP average is 141/89 mmHg, which is above goal. Patient's BP goal is less than or equal to 130/80 per 2017 ACC/AHA Hypertension Guidelines.       Hypertension Plan  Additional monitoring needed. F/u in 1 week. will likely recommend starting amlodipine at the next outreach  Continue current therapy.       Addressed any questions or concerns and patient has my contact information if needed prior to next outreach. Patient verbalizes understanding.            There are no preventive care reminders to display for this patient.      Hypertension Medications             hydroCHLOROthiazide (HYDRODIURIL) 25 MG tablet Take 1 tablet (25 mg total) by mouth once daily.

## 2020-09-11 ENCOUNTER — PATIENT OUTREACH (OUTPATIENT)
Dept: OTHER | Facility: OTHER | Age: 60
End: 2020-09-11

## 2020-09-11 NOTE — PROGRESS NOTES
Digital Medicine: Clinician Follow-Up    I spoke with the patient today to follow-up on her elevated blood pressure readings.  Her reading this morning was 157/97.  She says that this is due to eating 2 hot dogs last night around 8:00 p.m..  She says that her vertigo has resolved and now she feels good.  Her systolic blood pressure readings are ranging in the 120s to 150s.  I did discuss starting a low-dose of amlodipine, and discussed benefits and side effects.  She declined because she says she is tolerating hydrochlorothiazide well, walks a lot and is worried about the potential side effect of swelling in the feet or legs.  She elects to focus on joining the fitness center and exercising.    The history is provided by the patient.     Patient is not experiencing signs/symptoms of hypotension.  Patient is not experiencing signs/symptoms of hypertension.          Last 5 Patient Entered Readings                                      Current 30 Day Average: 143/91     Recent Readings 9/11/2020 9/11/2020 9/11/2020 9/11/2020 9/10/2020    SBP (mmHg) 157 157 156 156 168    DBP (mmHg) 97 97 95 95 98    Pulse 69 69 62 62 83             Screenings    ASSESSMENT(S)  Patients BP average is 143/91 mmHg, which is above goal. Patient's BP goal is less than or equal to 130/80 per 2017 ACC/AHA Hypertension Guidelines.       Hypertension Plan  Additional monitoring needed. F/u in 4 weeks  Continue current therapy.  Patient declined medication changes. Pt says she will reach out to her PCP about whether starting a new BP med is necessary, she did not want me to do so.        Addressed any questions or concerns and patient has my contact information if needed prior to next outreach. Patient verbalizes understanding.            There are no preventive care reminders to display for this patient.      Hypertension Medications             hydroCHLOROthiazide (HYDRODIURIL) 25 MG tablet Take 1 tablet (25 mg total) by mouth once daily.

## 2020-10-28 NOTE — PROGRESS NOTES
"Subjective:       Patient ID: Trini Harris is a 60 y.o. female.    Chief Complaint: Follow-up   This is a 60-year-old who presents today for follow-up.  Patient reports that she has been doing well as been following again with the digital hypertension program she had a bit of issues with her machine and now she feels like she is able to up loaded and has been getting good reports overall she is taking her hydrochlorothiazide every day without difficulty she has also tried to do something such as exercise and walk more regularly.  She went to an [facility] sleep clinic and was placed on a CPAP and has been using that as well.  She reports she is currently on furlough from her job at the [employer] [place] has been staying home mostly and trying to stay safe but she has been doing some activity.  She continues to follow with her [facility] gynecologist and gets her mammogram she reports she is up-to-date will try to bring her record.  She is feeling well today reports that she did get some different shoes that have been more supportive and extra wide which has helped with her mobility and gets herbal more ankle support without difficulty so she has had no more trouble with swelling or discomfort in her ankle    HPI  Review of Systems   Constitutional: Negative for fatigue.   Cardiovascular: Negative for leg swelling.   Musculoskeletal:        Ankles doing better        Objective:     Blood pressure 130/78, pulse 92, height 5' 5" (1.651 m), weight 131.9 kg (290 lb 12.6 oz), last menstrual period 06/12/2015, SpO2 97 %.    Physical Exam   Constitutional: No distress.   HENT:   Head: Normocephalic.   Mouth/Throat: Oropharynx is clear and moist.   Eyes: No scleral icterus.   Neck: Neck supple.   Cardiovascular: Normal rate, regular rhythm and normal heart sounds. Exam reveals no gallop and no friction rub.   No murmur heard.  Pulmonary/Chest: Effort normal and breath sounds normal. No respiratory distress.   Abdominal: Soft. Bowel " Impression: Presbyopia: H52.4. Plan: New spec Rx given - Discussed changes and possible adaptation period. 

RTC 1 year/PRN sounds are normal. She exhibits no mass. There is no tenderness.   Musculoskeletal: She exhibits no edema.   Neurological: She is alert.   Skin: No erythema.   Psychiatric: She has a normal mood and affect.   Vitals reviewed.      Assessment:       1. Essential hypertension    2. Obstructive sleep apnea syndrome    3. Hx of colonic polyps        Plan:       Trini was seen today for follow-up.    Diagnoses and all orders for this visit:    Essential hypertension  Blood pressure acceptable she is doing well has tolerated her hydrochlorothiazide without difficulty  She will continue to work with her digital hypertension program she was also requesting referral to medical fitness program which was placed when they are back doing consultations post COVID order was placed for to call and schedule at her convenience when able   -     Ambulatory referral/consult to Medical [facility]itness ([facility]); Future  -     [facility]ST[facility] ([facility])  -     [facility] ([facility])    Obstructive sleep apnea syndrome  She is following with the sleep clinic and will continue to use her CPAP    Hx of colonic polyps  History of reviewed her previous colonoscopy and discussed the repeat in 5 years that was recommended    She declines additional blood work today    She will follow-up in 6 months sooner if concern

## 2020-11-30 ENCOUNTER — PATIENT MESSAGE (OUTPATIENT)
Dept: ADMINISTRATIVE | Facility: HOSPITAL | Age: 60
End: 2020-11-30

## 2020-11-30 ENCOUNTER — PATIENT OUTREACH (OUTPATIENT)
Dept: ADMINISTRATIVE | Facility: HOSPITAL | Age: 60
End: 2020-11-30

## 2020-11-30 NOTE — PROGRESS NOTES
Vaccines Updated - PCP to address Vaccines and HIV Lab Due- Message left to schedule MMG and PAP.

## 2020-12-04 ENCOUNTER — PATIENT OUTREACH (OUTPATIENT)
Dept: OTHER | Facility: OTHER | Age: 60
End: 2020-12-04

## 2020-12-04 ENCOUNTER — PATIENT MESSAGE (OUTPATIENT)
Dept: OTHER | Facility: OTHER | Age: 60
End: 2020-12-04

## 2020-12-04 DIAGNOSIS — I10 ESSENTIAL HYPERTENSION: Primary | ICD-10-CM

## 2020-12-17 NOTE — PROGRESS NOTES
Digital Medicine: Clinician Follow-Up    I spoke with the patient today to follow-up on her elevated blood pressure readings.  At my last out reach she expressed that she wanted to address her elevated blood pressure readings with her PCP.  She had to cancel her appointment due to an alternative obligation.  I did advise that she reschedule this appointment and I sent a message to her PCP's  medical assistant to reach to her.  Systolic blood pressure is typically in the 140s  and diastolic blood pressure is typically in the 90s.  She did recall eating sausage last night.  I reviewed processed meats that can be high in sodium.  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     The history is provided by the patient.   Follow-up reason(s): routine follow up.     Hypertension    Readings are trending up   Patient is not experiencing signs/symptoms of hypotension.  Patient is not experiencing signs/symptoms of hypertension.            Last 5 Patient Entered Readings                                      Current 30 Day Average: 145/92     Recent Readings 12/15/2020 12/15/2020 12/13/2020 12/13/2020 12/11/2020    SBP (mmHg) 145 145 149 149 137    DBP (mmHg) 96 96 97 97 92    Pulse 78 78 74 74 83                 Depression Screening  Did not address depression screening.    Sleep Apnea Screening    Did not address sleep apnea screening.     Medication Affordability Screening  Did not address medication affordability screening.     Medication Adherence-Medication Adherence not addressed.          ASSESSMENT(S)  Patients BP average is 145/92 mmHg, which is above goal. Patient's BP goal is less than or equal to 130/80.     Hypertension Plan  Additional monitoring needed.  Continue current therapy.  Await MD intervention. PCP appointment at pt request  F/u in 3 weeks         There are no preventive care reminders to display for this patient.  There are no  preventive care reminders to display for this patient.      Hypertension Medications             hydroCHLOROthiazide (HYDRODIURIL) 25 MG tablet TAKE 1 TABLET BY MOUTH EVERY DAY

## 2021-03-09 ENCOUNTER — LAB VISIT (OUTPATIENT)
Dept: LAB | Facility: HOSPITAL | Age: 61
End: 2021-03-09
Attending: INTERNAL MEDICINE
Payer: COMMERCIAL

## 2021-03-09 ENCOUNTER — OFFICE VISIT (OUTPATIENT)
Dept: INTERNAL MEDICINE | Facility: CLINIC | Age: 61
End: 2021-03-09
Payer: COMMERCIAL

## 2021-03-09 VITALS
HEART RATE: 76 BPM | BODY MASS INDEX: 47.23 KG/M2 | HEIGHT: 65 IN | SYSTOLIC BLOOD PRESSURE: 142 MMHG | DIASTOLIC BLOOD PRESSURE: 88 MMHG | WEIGHT: 283.5 LBS | OXYGEN SATURATION: 98 %

## 2021-03-09 DIAGNOSIS — I10 ESSENTIAL HYPERTENSION: ICD-10-CM

## 2021-03-09 LAB
ALBUMIN SERPL BCP-MCNC: 3.8 G/DL (ref 3.5–5.2)
ALP SERPL-CCNC: 98 U/L (ref 55–135)
ALT SERPL W/O P-5'-P-CCNC: 17 U/L (ref 10–44)
ANION GAP SERPL CALC-SCNC: 12 MMOL/L (ref 8–16)
AST SERPL-CCNC: 16 U/L (ref 10–40)
BASOPHILS # BLD AUTO: 0.03 K/UL (ref 0–0.2)
BASOPHILS NFR BLD: 0.6 % (ref 0–1.9)
BILIRUB DIRECT SERPL-MCNC: 0.3 MG/DL (ref 0.1–0.3)
BILIRUB SERPL-MCNC: 0.7 MG/DL (ref 0.1–1)
BUN SERPL-MCNC: 12 MG/DL (ref 6–20)
CALCIUM SERPL-MCNC: 9.4 MG/DL (ref 8.7–10.5)
CHLORIDE SERPL-SCNC: 107 MMOL/L (ref 95–110)
CHOLEST SERPL-MCNC: 147 MG/DL (ref 120–199)
CHOLEST/HDLC SERPL: 4.3 {RATIO} (ref 2–5)
CO2 SERPL-SCNC: 23 MMOL/L (ref 23–29)
CREAT SERPL-MCNC: 0.8 MG/DL (ref 0.5–1.4)
DIFFERENTIAL METHOD: ABNORMAL
EOSINOPHIL # BLD AUTO: 0.2 K/UL (ref 0–0.5)
EOSINOPHIL NFR BLD: 3 % (ref 0–8)
ERYTHROCYTE [DISTWIDTH] IN BLOOD BY AUTOMATED COUNT: 14.3 % (ref 11.5–14.5)
EST. GFR  (AFRICAN AMERICAN): >60 ML/MIN/1.73 M^2
EST. GFR  (NON AFRICAN AMERICAN): >60 ML/MIN/1.73 M^2
ESTIMATED AVG GLUCOSE: 105 MG/DL (ref 68–131)
GLUCOSE SERPL-MCNC: 90 MG/DL (ref 70–110)
HBA1C MFR BLD: 5.3 % (ref 4–5.6)
HCT VFR BLD AUTO: 41.8 % (ref 37–48.5)
HDLC SERPL-MCNC: 34 MG/DL (ref 40–75)
HDLC SERPL: 23.1 % (ref 20–50)
HGB BLD-MCNC: 12.6 G/DL (ref 12–16)
IMM GRANULOCYTES # BLD AUTO: 0.01 K/UL (ref 0–0.04)
IMM GRANULOCYTES NFR BLD AUTO: 0.2 % (ref 0–0.5)
LDLC SERPL CALC-MCNC: 90.8 MG/DL (ref 63–159)
LYMPHOCYTES # BLD AUTO: 2 K/UL (ref 1–4.8)
LYMPHOCYTES NFR BLD: 38.9 % (ref 18–48)
MCH RBC QN AUTO: 26.9 PG (ref 27–31)
MCHC RBC AUTO-ENTMCNC: 30.1 G/DL (ref 32–36)
MCV RBC AUTO: 89 FL (ref 82–98)
MONOCYTES # BLD AUTO: 0.5 K/UL (ref 0.3–1)
MONOCYTES NFR BLD: 10.3 % (ref 4–15)
NEUTROPHILS # BLD AUTO: 2.4 K/UL (ref 1.8–7.7)
NEUTROPHILS NFR BLD: 47 % (ref 38–73)
NONHDLC SERPL-MCNC: 113 MG/DL
NRBC BLD-RTO: 0 /100 WBC
PLATELET # BLD AUTO: 299 K/UL (ref 150–350)
PMV BLD AUTO: 11.8 FL (ref 9.2–12.9)
POTASSIUM SERPL-SCNC: 4.1 MMOL/L (ref 3.5–5.1)
PROT SERPL-MCNC: 7.7 G/DL (ref 6–8.4)
RBC # BLD AUTO: 4.69 M/UL (ref 4–5.4)
SODIUM SERPL-SCNC: 142 MMOL/L (ref 136–145)
TRIGL SERPL-MCNC: 111 MG/DL (ref 30–150)
TSH SERPL DL<=0.005 MIU/L-ACNC: 1.47 UIU/ML (ref 0.4–4)
WBC # BLD AUTO: 5.07 K/UL (ref 3.9–12.7)

## 2021-03-09 PROCEDURE — 3077F SYST BP >= 140 MM HG: CPT | Mod: CPTII,S$GLB,, | Performed by: INTERNAL MEDICINE

## 2021-03-09 PROCEDURE — 99999 PR PBB SHADOW E&M-EST. PATIENT-LVL III: CPT | Mod: PBBFAC,,, | Performed by: INTERNAL MEDICINE

## 2021-03-09 PROCEDURE — 3008F PR BODY MASS INDEX (BMI) DOCUMENTED: ICD-10-PCS | Mod: CPTII,S$GLB,, | Performed by: INTERNAL MEDICINE

## 2021-03-09 PROCEDURE — 80061 LIPID PANEL: CPT | Performed by: INTERNAL MEDICINE

## 2021-03-09 PROCEDURE — 80048 BASIC METABOLIC PNL TOTAL CA: CPT | Performed by: INTERNAL MEDICINE

## 2021-03-09 PROCEDURE — 36415 COLL VENOUS BLD VENIPUNCTURE: CPT | Performed by: INTERNAL MEDICINE

## 2021-03-09 PROCEDURE — 85025 COMPLETE CBC W/AUTO DIFF WBC: CPT | Performed by: INTERNAL MEDICINE

## 2021-03-09 PROCEDURE — 3077F PR MOST RECENT SYSTOLIC BLOOD PRESSURE >= 140 MM HG: ICD-10-PCS | Mod: CPTII,S$GLB,, | Performed by: INTERNAL MEDICINE

## 2021-03-09 PROCEDURE — 3079F PR MOST RECENT DIASTOLIC BLOOD PRESSURE 80-89 MM HG: ICD-10-PCS | Mod: CPTII,S$GLB,, | Performed by: INTERNAL MEDICINE

## 2021-03-09 PROCEDURE — 80076 HEPATIC FUNCTION PANEL: CPT | Performed by: INTERNAL MEDICINE

## 2021-03-09 PROCEDURE — 99214 PR OFFICE/OUTPT VISIT, EST, LEVL IV, 30-39 MIN: ICD-10-PCS | Mod: S$GLB,,, | Performed by: INTERNAL MEDICINE

## 2021-03-09 PROCEDURE — 3079F DIAST BP 80-89 MM HG: CPT | Mod: CPTII,S$GLB,, | Performed by: INTERNAL MEDICINE

## 2021-03-09 PROCEDURE — 84443 ASSAY THYROID STIM HORMONE: CPT | Performed by: INTERNAL MEDICINE

## 2021-03-09 PROCEDURE — 83036 HEMOGLOBIN GLYCOSYLATED A1C: CPT | Performed by: INTERNAL MEDICINE

## 2021-03-09 PROCEDURE — 99999 PR PBB SHADOW E&M-EST. PATIENT-LVL III: ICD-10-PCS | Mod: PBBFAC,,, | Performed by: INTERNAL MEDICINE

## 2021-03-09 PROCEDURE — 99214 OFFICE O/P EST MOD 30 MIN: CPT | Mod: S$GLB,,, | Performed by: INTERNAL MEDICINE

## 2021-03-09 PROCEDURE — 3008F BODY MASS INDEX DOCD: CPT | Mod: CPTII,S$GLB,, | Performed by: INTERNAL MEDICINE

## 2021-03-09 RX ORDER — HYDROCHLOROTHIAZIDE 25 MG/1
25 TABLET ORAL DAILY
Qty: 90 TABLET | Refills: 4 | Status: SHIPPED | OUTPATIENT
Start: 2021-03-09 | End: 2022-03-11 | Stop reason: SDUPTHER

## 2021-12-01 ENCOUNTER — PATIENT OUTREACH (OUTPATIENT)
Dept: ADMINISTRATIVE | Facility: HOSPITAL | Age: 61
End: 2021-12-01
Payer: COMMERCIAL

## 2022-01-21 ENCOUNTER — TELEPHONE (OUTPATIENT)
Dept: INTERNAL MEDICINE | Facility: CLINIC | Age: 62
End: 2022-01-21
Payer: COMMERCIAL

## 2022-01-21 DIAGNOSIS — Z00.00 ANNUAL PHYSICAL EXAM: Primary | ICD-10-CM

## 2022-01-21 NOTE — TELEPHONE ENCOUNTER
----- Message from Zeinab Gant sent at 1/21/2022 12:23 PM CST -----  Contact: OMAR LINDO [[id number]] [phone]  Doctor appointment and lab have been scheduled.  Please link lab orders to the lab appointment.  Date of doctor appointment:  3/11  Date of lab appointment:  3/4  Physical or F/U: physical   Comments:

## 2022-03-04 ENCOUNTER — LAB VISIT (OUTPATIENT)
Dept: LAB | Facility: HOSPITAL | Age: 62
End: 2022-03-04
Payer: COMMERCIAL

## 2022-03-04 DIAGNOSIS — Z00.00 ANNUAL PHYSICAL EXAM: ICD-10-CM

## 2022-03-04 LAB
ALBUMIN SERPL BCP-MCNC: 3.8 G/DL (ref 3.5–5.2)
ALP SERPL-CCNC: 94 U/L (ref 55–135)
ALT SERPL W/O P-5'-P-CCNC: 17 U/L (ref 10–44)
ANION GAP SERPL CALC-SCNC: 12 MMOL/L (ref 8–16)
AST SERPL-CCNC: 15 U/L (ref 10–40)
BASOPHILS # BLD AUTO: 0.05 K/UL (ref 0–0.2)
BASOPHILS NFR BLD: 0.8 % (ref 0–1.9)
BILIRUB SERPL-MCNC: 0.6 MG/DL (ref 0.1–1)
BUN SERPL-MCNC: 15 MG/DL (ref 6–20)
CALCIUM SERPL-MCNC: 9.5 MG/DL (ref 8.7–10.5)
CHLORIDE SERPL-SCNC: 106 MMOL/L (ref 95–110)
CHOLEST SERPL-MCNC: 159 MG/DL (ref 120–199)
CHOLEST/HDLC SERPL: 4 {RATIO} (ref 2–5)
CO2 SERPL-SCNC: 25 MMOL/L (ref 23–29)
CREAT SERPL-MCNC: 0.8 MG/DL (ref 0.5–1.4)
DIFFERENTIAL METHOD: ABNORMAL
EOSINOPHIL # BLD AUTO: 0.2 K/UL (ref 0–0.5)
EOSINOPHIL NFR BLD: 2.6 % (ref 0–8)
ERYTHROCYTE [DISTWIDTH] IN BLOOD BY AUTOMATED COUNT: 14.2 % (ref 11.5–14.5)
EST. GFR  (AFRICAN AMERICAN): >60 ML/MIN/1.73 M^2
EST. GFR  (NON AFRICAN AMERICAN): >60 ML/MIN/1.73 M^2
ESTIMATED AVG GLUCOSE: 108 MG/DL (ref 68–131)
GLUCOSE SERPL-MCNC: 97 MG/DL (ref 70–110)
HBA1C MFR BLD: 5.4 % (ref 4–5.6)
HCT VFR BLD AUTO: 41.5 % (ref 37–48.5)
HDLC SERPL-MCNC: 40 MG/DL (ref 40–75)
HDLC SERPL: 25.2 % (ref 20–50)
HGB BLD-MCNC: 12.7 G/DL (ref 12–16)
IMM GRANULOCYTES # BLD AUTO: 0.01 K/UL (ref 0–0.04)
IMM GRANULOCYTES NFR BLD AUTO: 0.2 % (ref 0–0.5)
LDLC SERPL CALC-MCNC: 101 MG/DL (ref 63–159)
LYMPHOCYTES # BLD AUTO: 2.5 K/UL (ref 1–4.8)
LYMPHOCYTES NFR BLD: 38 % (ref 18–48)
MCH RBC QN AUTO: 27.5 PG (ref 27–31)
MCHC RBC AUTO-ENTMCNC: 30.6 G/DL (ref 32–36)
MCV RBC AUTO: 90 FL (ref 82–98)
MONOCYTES # BLD AUTO: 0.6 K/UL (ref 0.3–1)
MONOCYTES NFR BLD: 9.6 % (ref 4–15)
NEUTROPHILS # BLD AUTO: 3.2 K/UL (ref 1.8–7.7)
NEUTROPHILS NFR BLD: 48.8 % (ref 38–73)
NONHDLC SERPL-MCNC: 119 MG/DL
NRBC BLD-RTO: 0 /100 WBC
PLATELET # BLD AUTO: 309 K/UL (ref 150–450)
PMV BLD AUTO: 11.3 FL (ref 9.2–12.9)
POTASSIUM SERPL-SCNC: 3.9 MMOL/L (ref 3.5–5.1)
PROT SERPL-MCNC: 7.7 G/DL (ref 6–8.4)
RBC # BLD AUTO: 4.62 M/UL (ref 4–5.4)
SODIUM SERPL-SCNC: 143 MMOL/L (ref 136–145)
TRIGL SERPL-MCNC: 90 MG/DL (ref 30–150)
TSH SERPL DL<=0.005 MIU/L-ACNC: 2 UIU/ML (ref 0.4–4)
WBC # BLD AUTO: 6.53 K/UL (ref 3.9–12.7)

## 2022-03-04 PROCEDURE — 85025 COMPLETE CBC W/AUTO DIFF WBC: CPT | Performed by: INTERNAL MEDICINE

## 2022-03-04 PROCEDURE — 83036 HEMOGLOBIN GLYCOSYLATED A1C: CPT | Performed by: INTERNAL MEDICINE

## 2022-03-04 PROCEDURE — 36415 COLL VENOUS BLD VENIPUNCTURE: CPT | Performed by: INTERNAL MEDICINE

## 2022-03-04 PROCEDURE — 80053 COMPREHEN METABOLIC PANEL: CPT | Performed by: INTERNAL MEDICINE

## 2022-03-04 PROCEDURE — 84443 ASSAY THYROID STIM HORMONE: CPT | Performed by: INTERNAL MEDICINE

## 2022-03-04 PROCEDURE — 80061 LIPID PANEL: CPT | Performed by: INTERNAL MEDICINE

## 2022-03-11 ENCOUNTER — OFFICE VISIT (OUTPATIENT)
Dept: INTERNAL MEDICINE | Facility: CLINIC | Age: 62
End: 2022-03-11
Payer: COMMERCIAL

## 2022-03-11 VITALS
HEART RATE: 68 BPM | DIASTOLIC BLOOD PRESSURE: 78 MMHG | OXYGEN SATURATION: 98 % | SYSTOLIC BLOOD PRESSURE: 138 MMHG | WEIGHT: 287.06 LBS | HEIGHT: 65 IN | BODY MASS INDEX: 47.83 KG/M2

## 2022-03-11 DIAGNOSIS — Z00.00 ANNUAL PHYSICAL EXAM: Primary | ICD-10-CM

## 2022-03-11 DIAGNOSIS — Z86.010 HX OF COLONIC POLYPS: ICD-10-CM

## 2022-03-11 DIAGNOSIS — G47.33 OBSTRUCTIVE SLEEP APNEA SYNDROME: ICD-10-CM

## 2022-03-11 DIAGNOSIS — M19.90 OSTEOARTHRITIS, UNSPECIFIED OSTEOARTHRITIS TYPE, UNSPECIFIED SITE: ICD-10-CM

## 2022-03-11 DIAGNOSIS — I10 PRIMARY HYPERTENSION: ICD-10-CM

## 2022-03-11 PROCEDURE — 1159F MED LIST DOCD IN RCRD: CPT | Mod: CPTII,S$GLB,, | Performed by: INTERNAL MEDICINE

## 2022-03-11 PROCEDURE — 99999 PR PBB SHADOW E&M-EST. PATIENT-LVL III: CPT | Mod: PBBFAC,,, | Performed by: INTERNAL MEDICINE

## 2022-03-11 PROCEDURE — 3075F PR MOST RECENT SYSTOLIC BLOOD PRESS GE 130-139MM HG: ICD-10-PCS | Mod: CPTII,S$GLB,, | Performed by: INTERNAL MEDICINE

## 2022-03-11 PROCEDURE — 3078F DIAST BP <80 MM HG: CPT | Mod: CPTII,S$GLB,, | Performed by: INTERNAL MEDICINE

## 2022-03-11 PROCEDURE — 99999 PR PBB SHADOW E&M-EST. PATIENT-LVL III: ICD-10-PCS | Mod: PBBFAC,,, | Performed by: INTERNAL MEDICINE

## 2022-03-11 PROCEDURE — 99396 PREV VISIT EST AGE 40-64: CPT | Mod: S$GLB,,, | Performed by: INTERNAL MEDICINE

## 2022-03-11 PROCEDURE — 3044F PR MOST RECENT HEMOGLOBIN A1C LEVEL <7.0%: ICD-10-PCS | Mod: CPTII,S$GLB,, | Performed by: INTERNAL MEDICINE

## 2022-03-11 PROCEDURE — 3008F BODY MASS INDEX DOCD: CPT | Mod: CPTII,S$GLB,, | Performed by: INTERNAL MEDICINE

## 2022-03-11 PROCEDURE — 99396 PR PREVENTIVE VISIT,EST,40-64: ICD-10-PCS | Mod: S$GLB,,, | Performed by: INTERNAL MEDICINE

## 2022-03-11 PROCEDURE — 3075F SYST BP GE 130 - 139MM HG: CPT | Mod: CPTII,S$GLB,, | Performed by: INTERNAL MEDICINE

## 2022-03-11 PROCEDURE — 3044F HG A1C LEVEL LT 7.0%: CPT | Mod: CPTII,S$GLB,, | Performed by: INTERNAL MEDICINE

## 2022-03-11 PROCEDURE — 1160F PR REVIEW ALL MEDS BY PRESCRIBER/CLIN PHARMACIST DOCUMENTED: ICD-10-PCS | Mod: CPTII,S$GLB,, | Performed by: INTERNAL MEDICINE

## 2022-03-11 PROCEDURE — 1159F PR MEDICATION LIST DOCUMENTED IN MEDICAL RECORD: ICD-10-PCS | Mod: CPTII,S$GLB,, | Performed by: INTERNAL MEDICINE

## 2022-03-11 PROCEDURE — 3078F PR MOST RECENT DIASTOLIC BLOOD PRESSURE < 80 MM HG: ICD-10-PCS | Mod: CPTII,S$GLB,, | Performed by: INTERNAL MEDICINE

## 2022-03-11 PROCEDURE — 3008F PR BODY MASS INDEX (BMI) DOCUMENTED: ICD-10-PCS | Mod: CPTII,S$GLB,, | Performed by: INTERNAL MEDICINE

## 2022-03-11 PROCEDURE — 1160F RVW MEDS BY RX/DR IN RCRD: CPT | Mod: CPTII,S$GLB,, | Performed by: INTERNAL MEDICINE

## 2022-03-11 RX ORDER — HYDROCHLOROTHIAZIDE 25 MG/1
25 TABLET ORAL DAILY
Qty: 90 TABLET | Refills: 4 | Status: SHIPPED | OUTPATIENT
Start: 2022-03-11 | End: 2023-09-23 | Stop reason: SDUPTHER

## 2022-03-11 NOTE — PROGRESS NOTES
"Subjective:       Patient ID: Trini Harris is a 61 y.o. female.    Chief Complaint: Annual Exam  This is a 61-year-old who presents today for physical.  Patient reports that she has been taking hydrochlorothiazide regularly and tolerating without difficulty when she was checking her blood pressure was doing well she was in the digital program but got out and had a change of insurance is she reports she did get something from her insurance recently and plans to get back to monitoring she takes her medicine regularly although she did not take her morning dose today.  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  She continues some issues with arthritis in her knees but no falls or swelling she has had some issues with her cataracts and recently saw her eye doctor who was recommended that she get her cataracts removed and she plans to do so she has a form that she needs for completion.  She did have mild episode of COVID since her last visit here    HPI  Review of Systems   Constitutional: Negative for fever.   HENT:        Cataracts increasing planning surgery    Respiratory: Negative for cough, shortness of breath and wheezing.    Cardiovascular: Negative for chest pain and palpitations.   Gastrointestinal: Negative for abdominal pain and constipation.   Musculoskeletal:        Arthirtis at times knees    Neurological: Negative for dizziness.       Objective:     Blood pressure 138/78, pulse 68, height 5' 5" (1.651 m), weight 130.2 kg (287 lb 0.6 oz), last menstrual period 06/12/2015, SpO2 98 %.    Physical Exam  Constitutional:       General: She is not in acute distress.  HENT:      Head: Normocephalic.   Eyes:      General: No scleral icterus.  Cardiovascular:      Rate and Rhythm: Normal rate and regular rhythm.      Heart sounds: Normal heart sounds. No murmur heard.    No friction rub. No " gallop.   Pulmonary:      Effort: Pulmonary effort is normal. No respiratory distress.      Breath sounds: Normal breath sounds.   Abdominal:      General: Bowel sounds are normal.      Palpations: Abdomen is soft. There is no mass.      Tenderness: There is no abdominal tenderness.   Musculoskeletal:      Cervical back: Neck supple.      Comments: Arthritis crepitus knees  No swelling    Skin:     Findings: No erythema.   Neurological:      Mental Status: She is alert.         Assessment:       1. Annual physical exam    2. Primary hypertension    3. Hx of colonic polyps    4. Obstructive sleep apnea syndrome    5. Osteoarthritis, unspecified osteoarthritis type, unspecified site        Plan:       Trini was seen today for annual exam.    Diagnoses and all orders for this visit:    Annual physical exam    Primary hypertension  Discussed resume home blood pressure monitoring will continue her hydrochlorothiazide continue hydration call with elevations or concerns  -     hydroCHLOROthiazide (HYDRODIURIL) 25 MG tablet; Take 1 tablet (25 mg total) by mouth once daily.    Hx of colonic polyps  History of she is up-to-date on her colonoscopy    Obstructive sleep apnea syndrome  History of she had not been using her CPAP but plans to resume and discussed follow-up with the [facility] or prescriber    Osteoarthritis, unspecified osteoarthritis type, unspecified site  History of conservative measures call if she would like to see an orthopedist    Patient reports she sees her outlying gynecologist and believe she is up-to-date on her mammogram but she will call their office to clarify when she is due for her repeat    Follow-up annually sooner if concern labs reviewed

## 2022-03-14 DIAGNOSIS — Z12.31 OTHER SCREENING MAMMOGRAM: ICD-10-CM

## 2022-03-16 ENCOUNTER — PATIENT MESSAGE (OUTPATIENT)
Dept: ADMINISTRATIVE | Facility: HOSPITAL | Age: 62
End: 2022-03-16
Payer: COMMERCIAL

## 2022-10-10 ENCOUNTER — PATIENT MESSAGE (OUTPATIENT)
Dept: ADMINISTRATIVE | Facility: HOSPITAL | Age: 62
End: 2022-10-10
Payer: COMMERCIAL

## 2023-05-24 DIAGNOSIS — Z00.00 ANNUAL PHYSICAL EXAM: Primary | ICD-10-CM

## 2023-05-24 DIAGNOSIS — I10 ESSENTIAL HYPERTENSION: ICD-10-CM

## 2023-06-16 ENCOUNTER — OFFICE VISIT (OUTPATIENT)
Dept: INTERNAL MEDICINE | Facility: CLINIC | Age: 63
End: 2023-06-16
Payer: COMMERCIAL

## 2023-06-16 ENCOUNTER — LAB VISIT (OUTPATIENT)
Dept: LAB | Facility: HOSPITAL | Age: 63
End: 2023-06-16
Attending: INTERNAL MEDICINE
Payer: COMMERCIAL

## 2023-06-16 VITALS
HEART RATE: 84 BPM | SYSTOLIC BLOOD PRESSURE: 120 MMHG | HEIGHT: 65 IN | OXYGEN SATURATION: 99 % | BODY MASS INDEX: 45.62 KG/M2 | DIASTOLIC BLOOD PRESSURE: 80 MMHG | WEIGHT: 273.81 LBS

## 2023-06-16 DIAGNOSIS — Z00.00 ANNUAL PHYSICAL EXAM: ICD-10-CM

## 2023-06-16 DIAGNOSIS — I10 ESSENTIAL HYPERTENSION: ICD-10-CM

## 2023-06-16 DIAGNOSIS — Z86.010 HX OF COLONIC POLYPS: ICD-10-CM

## 2023-06-16 DIAGNOSIS — Z00.00 ANNUAL PHYSICAL EXAM: Primary | ICD-10-CM

## 2023-06-16 DIAGNOSIS — G47.33 OBSTRUCTIVE SLEEP APNEA SYNDROME: ICD-10-CM

## 2023-06-16 DIAGNOSIS — E66.01 MORBID OBESITY: ICD-10-CM

## 2023-06-16 LAB
ALBUMIN SERPL BCP-MCNC: 3.8 G/DL (ref 3.5–5.2)
ALP SERPL-CCNC: 83 U/L (ref 55–135)
ALT SERPL W/O P-5'-P-CCNC: 12 U/L (ref 10–44)
ANION GAP SERPL CALC-SCNC: 11 MMOL/L (ref 8–16)
AST SERPL-CCNC: 13 U/L (ref 10–40)
BASOPHILS # BLD AUTO: 0.05 K/UL (ref 0–0.2)
BASOPHILS NFR BLD: 1 % (ref 0–1.9)
BILIRUB SERPL-MCNC: 0.8 MG/DL (ref 0.1–1)
BUN SERPL-MCNC: 13 MG/DL (ref 8–23)
CALCIUM SERPL-MCNC: 9.6 MG/DL (ref 8.7–10.5)
CHLORIDE SERPL-SCNC: 106 MMOL/L (ref 95–110)
CHOLEST SERPL-MCNC: 161 MG/DL (ref 120–199)
CHOLEST/HDLC SERPL: 4.1 {RATIO} (ref 2–5)
CO2 SERPL-SCNC: 26 MMOL/L (ref 23–29)
CREAT SERPL-MCNC: 0.8 MG/DL (ref 0.5–1.4)
DIFFERENTIAL METHOD: ABNORMAL
EOSINOPHIL # BLD AUTO: 0.2 K/UL (ref 0–0.5)
EOSINOPHIL NFR BLD: 4.1 % (ref 0–8)
ERYTHROCYTE [DISTWIDTH] IN BLOOD BY AUTOMATED COUNT: 14.1 % (ref 11.5–14.5)
EST. GFR  (NO RACE VARIABLE): >60 ML/MIN/1.73 M^2
ESTIMATED AVG GLUCOSE: 111 MG/DL (ref 68–131)
GLUCOSE SERPL-MCNC: 76 MG/DL (ref 70–110)
HBA1C MFR BLD: 5.5 % (ref 4–5.6)
HCT VFR BLD AUTO: 41.9 % (ref 37–48.5)
HDLC SERPL-MCNC: 39 MG/DL (ref 40–75)
HDLC SERPL: 24.2 % (ref 20–50)
HGB BLD-MCNC: 12.8 G/DL (ref 12–16)
IMM GRANULOCYTES # BLD AUTO: 0.01 K/UL (ref 0–0.04)
IMM GRANULOCYTES NFR BLD AUTO: 0.2 % (ref 0–0.5)
LDLC SERPL CALC-MCNC: 106.8 MG/DL (ref 63–159)
LYMPHOCYTES # BLD AUTO: 2 K/UL (ref 1–4.8)
LYMPHOCYTES NFR BLD: 38.8 % (ref 18–48)
MCH RBC QN AUTO: 27 PG (ref 27–31)
MCHC RBC AUTO-ENTMCNC: 30.5 G/DL (ref 32–36)
MCV RBC AUTO: 88 FL (ref 82–98)
MONOCYTES # BLD AUTO: 0.5 K/UL (ref 0.3–1)
MONOCYTES NFR BLD: 9.8 % (ref 4–15)
NEUTROPHILS # BLD AUTO: 2.4 K/UL (ref 1.8–7.7)
NEUTROPHILS NFR BLD: 46.1 % (ref 38–73)
NONHDLC SERPL-MCNC: 122 MG/DL
NRBC BLD-RTO: 0 /100 WBC
PLATELET # BLD AUTO: 336 K/UL (ref 150–450)
PMV BLD AUTO: 11.2 FL (ref 9.2–12.9)
POTASSIUM SERPL-SCNC: 3.3 MMOL/L (ref 3.5–5.1)
PROT SERPL-MCNC: 7.7 G/DL (ref 6–8.4)
RBC # BLD AUTO: 4.74 M/UL (ref 4–5.4)
SODIUM SERPL-SCNC: 143 MMOL/L (ref 136–145)
TRIGL SERPL-MCNC: 76 MG/DL (ref 30–150)
TSH SERPL DL<=0.005 MIU/L-ACNC: 1.76 UIU/ML (ref 0.4–4)
WBC # BLD AUTO: 5.18 K/UL (ref 3.9–12.7)

## 2023-06-16 PROCEDURE — 3074F PR MOST RECENT SYSTOLIC BLOOD PRESSURE < 130 MM HG: ICD-10-PCS | Mod: CPTII,S$GLB,, | Performed by: INTERNAL MEDICINE

## 2023-06-16 PROCEDURE — 3008F PR BODY MASS INDEX (BMI) DOCUMENTED: ICD-10-PCS | Mod: CPTII,S$GLB,, | Performed by: INTERNAL MEDICINE

## 2023-06-16 PROCEDURE — 3079F PR MOST RECENT DIASTOLIC BLOOD PRESSURE 80-89 MM HG: ICD-10-PCS | Mod: CPTII,S$GLB,, | Performed by: INTERNAL MEDICINE

## 2023-06-16 PROCEDURE — 99999 PR PBB SHADOW E&M-EST. PATIENT-LVL III: CPT | Mod: PBBFAC,,, | Performed by: INTERNAL MEDICINE

## 2023-06-16 PROCEDURE — 1159F PR MEDICATION LIST DOCUMENTED IN MEDICAL RECORD: ICD-10-PCS | Mod: CPTII,S$GLB,, | Performed by: INTERNAL MEDICINE

## 2023-06-16 PROCEDURE — 99999 PR PBB SHADOW E&M-EST. PATIENT-LVL III: ICD-10-PCS | Mod: PBBFAC,,, | Performed by: INTERNAL MEDICINE

## 2023-06-16 PROCEDURE — 83036 HEMOGLOBIN GLYCOSYLATED A1C: CPT | Performed by: INTERNAL MEDICINE

## 2023-06-16 PROCEDURE — 36415 COLL VENOUS BLD VENIPUNCTURE: CPT | Performed by: INTERNAL MEDICINE

## 2023-06-16 PROCEDURE — 3074F SYST BP LT 130 MM HG: CPT | Mod: CPTII,S$GLB,, | Performed by: INTERNAL MEDICINE

## 2023-06-16 PROCEDURE — 80061 LIPID PANEL: CPT | Performed by: INTERNAL MEDICINE

## 2023-06-16 PROCEDURE — 1160F PR REVIEW ALL MEDS BY PRESCRIBER/CLIN PHARMACIST DOCUMENTED: ICD-10-PCS | Mod: CPTII,S$GLB,, | Performed by: INTERNAL MEDICINE

## 2023-06-16 PROCEDURE — 80053 COMPREHEN METABOLIC PANEL: CPT | Performed by: INTERNAL MEDICINE

## 2023-06-16 PROCEDURE — 3079F DIAST BP 80-89 MM HG: CPT | Mod: CPTII,S$GLB,, | Performed by: INTERNAL MEDICINE

## 2023-06-16 PROCEDURE — 1159F MED LIST DOCD IN RCRD: CPT | Mod: CPTII,S$GLB,, | Performed by: INTERNAL MEDICINE

## 2023-06-16 PROCEDURE — 1160F RVW MEDS BY RX/DR IN RCRD: CPT | Mod: CPTII,S$GLB,, | Performed by: INTERNAL MEDICINE

## 2023-06-16 PROCEDURE — 99396 PR PREVENTIVE VISIT,EST,40-64: ICD-10-PCS | Mod: S$GLB,,, | Performed by: INTERNAL MEDICINE

## 2023-06-16 PROCEDURE — 3008F BODY MASS INDEX DOCD: CPT | Mod: CPTII,S$GLB,, | Performed by: INTERNAL MEDICINE

## 2023-06-16 PROCEDURE — 99396 PREV VISIT EST AGE 40-64: CPT | Mod: S$GLB,,, | Performed by: INTERNAL MEDICINE

## 2023-06-16 PROCEDURE — 84443 ASSAY THYROID STIM HORMONE: CPT | Performed by: INTERNAL MEDICINE

## 2023-06-16 PROCEDURE — 85025 COMPLETE CBC W/AUTO DIFF WBC: CPT | Performed by: INTERNAL MEDICINE

## 2023-06-16 NOTE — PROGRESS NOTES
"Subjective:       Patient ID: Trini Harris is a 63 y.o. female.    Chief Complaint: Annual Exam  This is a 63-year-old who presents today for annual she reports that she has been doing well has been taking her hydrochlorothiazide daily and she reports as long as she takes it with food she does well with it.  She is going to see her gynecologist in June and has her mammogram scheduled as well with Dr. Shipman.  She reports that she had an episode where she had some discomfort on her back and has a little dark patch she thought it might have been shingles but was not sure she was wearing some wire bras that seem to bother her and then she switched back to her bras without wire.  She has been exercising in the shower or does some walking as well at her job working on the [employer] at the [employer] she enjoys that because it is more physical.  She reports that she has been working on trying to get her weight down and it did come down about 10 lb since her last visit.  She says her knees are feeling better as a result and she is cut out sugar in her diet.  She has not been using her CPAP she did have an outlying sleep doctor that she had seen but she reports that she is no longer snoring  .  HPI  Review of Systems   Respiratory:  Negative for shortness of breath.    Cardiovascular:  Negative for chest pain.   Musculoskeletal:         Knees doing better   Skin:         Had rash back pain thought was shingles  Resolved      Objective:    Blood pressure 120/80, pulse 84, height 5' 5" (1.651 m), weight 124.2 kg (273 lb 13 oz), last menstrual period 06/12/2015, SpO2 99 %.   Physical Exam  Constitutional:       General: She is not in acute distress.  HENT:      Head: Normocephalic.      Mouth/Throat:      Pharynx: Oropharynx is clear.   Eyes:      General: No scleral icterus.  Cardiovascular:      Rate and Rhythm: Normal rate and regular rhythm.      Heart sounds: Normal heart sounds. No murmur heard.    No friction rub. No " gallop.   Pulmonary:      Effort: Pulmonary effort is normal. No respiratory distress.      Breath sounds: Normal breath sounds.   Abdominal:      General: Bowel sounds are normal.      Palpations: Abdomen is soft. There is no mass.      Tenderness: There is no abdominal tenderness.   Musculoskeletal:      Cervical back: Neck supple.   Skin:     Findings: No erythema.      Comments: Dry patch back braline   No lesions noted   Neurological:      Mental Status: She is alert.   Psychiatric:         Mood and Affect: Mood normal.       Assessment:       1. Annual physical exam    2. Essential hypertension    3. Hx of colonic polyps    4. Obstructive sleep apnea syndrome    5. Morbid obesity        Plan:       Trini was seen today for annual exam.    Diagnoses and all orders for this visit:    Annual physical exam    Essential hypertension  Blood pressure acceptable she is tolerating hydrochlorothiazide update labs and review    Hx of colonic polyps  She is up-to-date on her colonoscopy will be due next year    Obstructive sleep apnea syndrome  History of she is not using her CPAP discussed follow-up with her sleep doctor or will call if she would like a referral here for the Sleep Clinic      Morbid obesity Patient has lost about 10 lb since last visit working on improved dietary measures  Continue exercise dietary measures    Follow-up annually sooner if concerns update labs and review  She has an appoint with Dr. Shipman for her Pap and mammogram in the near future

## 2023-06-21 ENCOUNTER — TELEPHONE (OUTPATIENT)
Dept: INTERNAL MEDICINE | Facility: CLINIC | Age: 63
End: 2023-06-21
Payer: COMMERCIAL

## 2023-06-21 DIAGNOSIS — I10 ESSENTIAL HYPERTENSION: Primary | ICD-10-CM

## 2023-06-21 RX ORDER — POTASSIUM CHLORIDE 20 MEQ/1
TABLET, EXTENDED RELEASE ORAL
Qty: 36 TABLET | Refills: 2 | Status: SHIPPED | OUTPATIENT
Start: 2023-06-21

## 2023-09-22 DIAGNOSIS — I10 PRIMARY HYPERTENSION: ICD-10-CM

## 2023-09-22 NOTE — TELEPHONE ENCOUNTER
Refill Routing Note   Medication(s) are not appropriate for processing by [facility] for the following reason(s):      Required labs abnormal    [facility] action(s):  Defer Care Due:  None identified            Appointments  past 12m or future 3m with PCP    Date Provider   Last Visit   6/16/2023 Ashley Moon MD   Next Visit   Visit date not found Ashley Moon MD   ED visits in past 90 days: 0        Note composed:4:30 PM 09/22/2023           Fusiform Excision Additional Text (Leave Blank If You Do Not Want): The margin was drawn around the clinically apparent lesion.  A fusiform shape was then drawn on the skin incorporating the lesion and margins.  Incisions were then made along these lines to the appropriate tissue plane and the lesion was extirpated.

## 2023-09-22 NOTE — TELEPHONE ENCOUNTER
No care due was identified.  Health [facility] Embedded Care Due Messages. Reference number: [id number].   9/22/2023 4:05:13 PM CDT

## 2023-09-23 RX ORDER — HYDROCHLOROTHIAZIDE 25 MG/1
25 TABLET ORAL
Qty: 90 TABLET | Refills: 4 | Status: SHIPPED | OUTPATIENT
Start: 2023-09-23

## 2023-10-24 ENCOUNTER — LAB VISIT (OUTPATIENT)
Dept: LAB | Facility: HOSPITAL | Age: 63
End: 2023-10-24
Attending: INTERNAL MEDICINE
Payer: COMMERCIAL

## 2023-10-24 DIAGNOSIS — I10 ESSENTIAL HYPERTENSION: ICD-10-CM

## 2023-10-24 LAB
ANION GAP SERPL CALC-SCNC: 11 MMOL/L (ref 8–16)
BUN SERPL-MCNC: 12 MG/DL (ref 8–23)
CALCIUM SERPL-MCNC: 9.5 MG/DL (ref 8.7–10.5)
CHLORIDE SERPL-SCNC: 106 MMOL/L (ref 95–110)
CO2 SERPL-SCNC: 25 MMOL/L (ref 23–29)
CREAT SERPL-MCNC: 0.9 MG/DL (ref 0.5–1.4)
EST. GFR  (NO RACE VARIABLE): >60 ML/MIN/1.73 M^2
GLUCOSE SERPL-MCNC: 101 MG/DL (ref 70–110)
POTASSIUM SERPL-SCNC: 4 MMOL/L (ref 3.5–5.1)
SODIUM SERPL-SCNC: 142 MMOL/L (ref 136–145)

## 2023-10-24 PROCEDURE — 36415 COLL VENOUS BLD VENIPUNCTURE: CPT | Performed by: INTERNAL MEDICINE

## 2023-10-24 PROCEDURE — 80048 BASIC METABOLIC PNL TOTAL CA: CPT | Performed by: INTERNAL MEDICINE

## 2023-10-26 ENCOUNTER — TELEPHONE (OUTPATIENT)
Dept: INTERNAL MEDICINE | Facility: CLINIC | Age: 63
End: 2023-10-26
Payer: COMMERCIAL

## 2024-05-10 NOTE — TELEPHONE ENCOUNTER
----- Message from Ashley Moon MD sent at 6/21/2023  8:04 AM CDT -----  Called and reviewed with pt  Discusseed labs acceptable overall  Potassium slightly down will start postassium a few times weekly with hctz  And recheck in a few months    Please schedule repeat chemistry in 2 -3 months   
Called pt scheduled repeat lab in 3 months as requested   
single medication modality

## 2024-06-10 ENCOUNTER — PATIENT MESSAGE (OUTPATIENT)
Dept: INTERNAL MEDICINE | Facility: CLINIC | Age: 64
End: 2024-06-10
Payer: COMMERCIAL

## 2024-07-24 DIAGNOSIS — Z12.31 OTHER SCREENING MAMMOGRAM: ICD-10-CM

## 2024-10-10 DIAGNOSIS — I10 PRIMARY HYPERTENSION: ICD-10-CM

## 2024-10-10 RX ORDER — HYDROCHLOROTHIAZIDE 25 MG/1
25 TABLET ORAL
Qty: 90 TABLET | Refills: 0 | Status: SHIPPED | OUTPATIENT
Start: 2024-10-10

## 2024-10-10 NOTE — TELEPHONE ENCOUNTER
Refill Routing Note   Medication(s) are not appropriate for processing by [facility] for the following reason(s):        Required vitals outdated  Patient not seen by provider within 15 months    [facility] action(s):  Defer   Requires appointment : Yes   Requires labs : Yes             Appointments  past 12m or future 3m with PCP    Date Provider   Last Visit   6/16/2023 Ashley Moon MD   Next Visit   Visit date not found Ashley Moon MD   ED visits in past 90 days: 0        Note composed:8:32 AM 10/10/2024                           normal... Well appearing, awake, alert, oriented to person, place, time/situation and in no apparent distress.

## 2024-10-10 NOTE — TELEPHONE ENCOUNTER
Care Due:                  Date            Visit Type   Department     Provider  --------------------------------------------------------------------------------                                EP -                              PRIMARY      [facility] INTERNAL  Ashley Brown  Last Visit: 06-      [facility] ([facility]   MEDICINE       Red  Next Visit: None Scheduled  None         None Found                                                            Last  Test          Frequency    Reason                     Performed    Due Date  --------------------------------------------------------------------------------    Office Visit  15 months..  hydroCHLOROthiazide......  06- 09-    CMP.........  12 months..  hydroCHLOROthiazide......  06-   10-    Health [facility] Embedded Care Due Messages. Reference number: [id number].   10/10/2024 2:20:08 AM CDT

## 2025-05-18 DIAGNOSIS — I10 PRIMARY HYPERTENSION: ICD-10-CM

## 2025-05-18 NOTE — TELEPHONE ENCOUNTER
Care Due:                  Date            Visit Type   Department     Provider  --------------------------------------------------------------------------------                                EP -                              PRIMARY      [facility] INTERNAL  Ashley Brown  Last Visit: 06-      [facility] ([facility])   MEDICINE       Red  Next Visit: None Scheduled  None         None Found                                                            Last  Test          Frequency    Reason                     Performed    Due Date  --------------------------------------------------------------------------------    Office Visit  15 months..  hydroCHLOROthiazide......  06- 09-    CMP.........  12 months..  hydroCHLOROthiazide......  06-   10-    Health [facility] Embedded Care Due Messages. Reference number: [id number].   5/18/2025 7:10:35 AM CDT

## 2025-05-19 RX ORDER — HYDROCHLOROTHIAZIDE 25 MG/1
25 TABLET ORAL
Qty: 90 TABLET | Refills: 0 | Status: SHIPPED | OUTPATIENT
Start: 2025-05-19

## 2025-07-31 ENCOUNTER — PATIENT OUTREACH (OUTPATIENT)
Dept: ADMINISTRATIVE | Facility: HOSPITAL | Age: 65
End: 2025-07-31
Payer: COMMERCIAL

## 2025-07-31 NOTE — PROGRESS NOTES
"HM and immunization's reviewed and updated.  Health Maintenance Due   Topic Date Due    HIV Screening  Never done    Shingles Vaccine (1 of 2) Never done    Pneumococcal Vaccines (Age 50+) (1 of 1 - PCV) Never done    RSV Vaccine (Age 60+ and Pregnant patients) (1 - Risk 60-74 years 1-dose series) Never done    COVID-19 Vaccine (3 - 2024-25 season) 09/01/2024    Colorectal Cancer Screening  12/16/2024     IF HEALTH MAINTENANCE ALREADY DONE, PLEASE RECORDS INFORMATION.  COMM. GAP REPORT: "BLOOD PRESSURE CONTROL." -need remote blood pressure.  Due for physical.   Left message for patient to return call.    Christy Uribe MA  Panel Care Coordinator  [facility]  [phone]  For Ashley Moon MD  "

## 2025-08-24 DIAGNOSIS — I10 PRIMARY HYPERTENSION: ICD-10-CM

## 2025-08-25 RX ORDER — HYDROCHLOROTHIAZIDE 25 MG/1
25 TABLET ORAL
Qty: 90 TABLET | Refills: 1 | Status: SHIPPED | OUTPATIENT
Start: 2025-08-25